# Patient Record
Sex: MALE | Race: WHITE | Employment: OTHER | URBAN - METROPOLITAN AREA
[De-identification: names, ages, dates, MRNs, and addresses within clinical notes are randomized per-mention and may not be internally consistent; named-entity substitution may affect disease eponyms.]

---

## 2019-01-23 ENCOUNTER — ANESTHESIA EVENT (OUTPATIENT)
Dept: OPERATING ROOM | Age: 54
End: 2019-01-23
Payer: COMMERCIAL

## 2019-01-29 RX ORDER — ALLOPURINOL 100 MG/1
200 TABLET ORAL DAILY
COMMUNITY

## 2019-01-29 RX ORDER — HYDROCODONE BITARTRATE AND ACETAMINOPHEN 7.5; 325 MG/1; MG/1
1 TABLET ORAL EVERY 8 HOURS PRN
COMMUNITY
End: 2019-03-22 | Stop reason: ALTCHOICE

## 2019-01-29 RX ORDER — LEVOTHYROXINE SODIUM 0.05 MG/1
50 TABLET ORAL DAILY
COMMUNITY

## 2019-02-04 ASSESSMENT — LIFESTYLE VARIABLES: SMOKING_STATUS: 1

## 2019-02-06 ENCOUNTER — HOSPITAL ENCOUNTER (OUTPATIENT)
Age: 54
Setting detail: OUTPATIENT SURGERY
Discharge: HOME | End: 2019-02-06
Attending: SURGERY | Admitting: SURGERY
Payer: COMMERCIAL

## 2019-02-06 ENCOUNTER — ANESTHESIA (OUTPATIENT)
Dept: OPERATING ROOM | Age: 54
End: 2019-02-06
Payer: COMMERCIAL

## 2019-02-06 VITALS
OXYGEN SATURATION: 95 % | HEART RATE: 93 BPM | SYSTOLIC BLOOD PRESSURE: 135 MMHG | BODY MASS INDEX: 38.22 KG/M2 | RESPIRATION RATE: 16 BRPM | WEIGHT: 267 LBS | HEIGHT: 70 IN | DIASTOLIC BLOOD PRESSURE: 91 MMHG

## 2019-02-06 VITALS
RESPIRATION RATE: 3 BRPM | SYSTOLIC BLOOD PRESSURE: 111 MMHG | DIASTOLIC BLOOD PRESSURE: 60 MMHG | OXYGEN SATURATION: 97 %

## 2019-02-06 DIAGNOSIS — S63.392A TRAUMATIC RUPTURE OF OTHER LIGAMENT OF LEFT WRIST, INITIAL ENCOUNTER: ICD-10-CM

## 2019-02-06 DIAGNOSIS — G56.22 LESION OF LEFT ULNAR NERVE: Primary | ICD-10-CM

## 2019-02-06 PROCEDURE — 6360000002 HC RX W HCPCS: Performed by: SURGERY

## 2019-02-06 PROCEDURE — 7100000010 HC PHASE II RECOVERY - FIRST 15 MIN: Performed by: SURGERY

## 2019-02-06 PROCEDURE — 3600000013 HC SURGERY LEVEL 3 ADDTL 15MIN: Performed by: SURGERY

## 2019-02-06 PROCEDURE — 2500000003 HC RX 250 WO HCPCS: Performed by: NURSE ANESTHETIST, CERTIFIED REGISTERED

## 2019-02-06 PROCEDURE — 2580000003 HC RX 258: Performed by: NURSE ANESTHETIST, CERTIFIED REGISTERED

## 2019-02-06 PROCEDURE — 2720000010 HC SURG SUPPLY STERILE: Performed by: SURGERY

## 2019-02-06 PROCEDURE — 7100000000 HC PACU RECOVERY - FIRST 15 MIN: Performed by: SURGERY

## 2019-02-06 PROCEDURE — 6360000002 HC RX W HCPCS: Performed by: ANESTHESIOLOGY

## 2019-02-06 PROCEDURE — 2580000003 HC RX 258: Performed by: SURGERY

## 2019-02-06 PROCEDURE — 7100000011 HC PHASE II RECOVERY - ADDTL 15 MIN: Performed by: SURGERY

## 2019-02-06 PROCEDURE — 6360000002 HC RX W HCPCS: Performed by: NURSE ANESTHETIST, CERTIFIED REGISTERED

## 2019-02-06 PROCEDURE — 2580000003 HC RX 258: Performed by: ANESTHESIOLOGY

## 2019-02-06 PROCEDURE — 6370000000 HC RX 637 (ALT 250 FOR IP): Performed by: ANESTHESIOLOGY

## 2019-02-06 PROCEDURE — 88311 DECALCIFY TISSUE: CPT

## 2019-02-06 PROCEDURE — 3600000003 HC SURGERY LEVEL 3 BASE: Performed by: SURGERY

## 2019-02-06 PROCEDURE — 88304 TISSUE EXAM BY PATHOLOGIST: CPT

## 2019-02-06 PROCEDURE — C1713 ANCHOR/SCREW BN/BN,TIS/BN: HCPCS | Performed by: SURGERY

## 2019-02-06 PROCEDURE — 3700000000 HC ANESTHESIA ATTENDED CARE: Performed by: SURGERY

## 2019-02-06 PROCEDURE — 2709999900 HC NON-CHARGEABLE SUPPLY: Performed by: SURGERY

## 2019-02-06 PROCEDURE — 3700000001 HC ADD 15 MINUTES (ANESTHESIA): Performed by: SURGERY

## 2019-02-06 PROCEDURE — 7100000001 HC PACU RECOVERY - ADDTL 15 MIN: Performed by: SURGERY

## 2019-02-06 PROCEDURE — 2500000003 HC RX 250 WO HCPCS: Performed by: SURGERY

## 2019-02-06 DEVICE — K WIRE FIX L6IN DIA1.6MM ST S STL 3 SIDE DBL TRCR BOTH END: Type: IMPLANTABLE DEVICE | Site: WRIST | Status: FUNCTIONAL

## 2019-02-06 DEVICE — K WIRE FIX L6IN DIA1.1MM ST S STL 3 SIDE DBL TRCR BOTH END: Type: IMPLANTABLE DEVICE | Site: WRIST | Status: FUNCTIONAL

## 2019-02-06 DEVICE — SCREW BNE L24MM DIA2.5MM MIC FT ANK TI SELF DRL ST CANN: Type: IMPLANTABLE DEVICE | Site: WRIST | Status: FUNCTIONAL

## 2019-02-06 RX ORDER — MORPHINE SULFATE 2 MG/ML
1 INJECTION, SOLUTION INTRAMUSCULAR; INTRAVENOUS EVERY 5 MIN PRN
Status: DISCONTINUED | OUTPATIENT
Start: 2019-02-06 | End: 2019-02-06 | Stop reason: HOSPADM

## 2019-02-06 RX ORDER — FENTANYL CITRATE 50 UG/ML
50 INJECTION, SOLUTION INTRAMUSCULAR; INTRAVENOUS EVERY 5 MIN PRN
Status: DISCONTINUED | OUTPATIENT
Start: 2019-02-06 | End: 2019-02-06 | Stop reason: HOSPADM

## 2019-02-06 RX ORDER — MEPERIDINE HYDROCHLORIDE 25 MG/ML
12.5 INJECTION INTRAMUSCULAR; INTRAVENOUS; SUBCUTANEOUS EVERY 5 MIN PRN
Status: DISCONTINUED | OUTPATIENT
Start: 2019-02-06 | End: 2019-02-06 | Stop reason: HOSPADM

## 2019-02-06 RX ORDER — PROMETHAZINE HYDROCHLORIDE 25 MG/ML
INJECTION, SOLUTION INTRAMUSCULAR; INTRAVENOUS PRN
Status: DISCONTINUED | OUTPATIENT
Start: 2019-02-06 | End: 2019-02-06 | Stop reason: SDUPTHER

## 2019-02-06 RX ORDER — MEPERIDINE HYDROCHLORIDE 50 MG/ML
INJECTION INTRAMUSCULAR; INTRAVENOUS; SUBCUTANEOUS PRN
Status: DISCONTINUED | OUTPATIENT
Start: 2019-02-06 | End: 2019-02-06 | Stop reason: SDUPTHER

## 2019-02-06 RX ORDER — HYDRALAZINE HYDROCHLORIDE 20 MG/ML
5 INJECTION INTRAMUSCULAR; INTRAVENOUS EVERY 10 MIN PRN
Status: DISCONTINUED | OUTPATIENT
Start: 2019-02-06 | End: 2019-02-06 | Stop reason: HOSPADM

## 2019-02-06 RX ORDER — MIDAZOLAM HYDROCHLORIDE 1 MG/ML
INJECTION INTRAMUSCULAR; INTRAVENOUS PRN
Status: DISCONTINUED | OUTPATIENT
Start: 2019-02-06 | End: 2019-02-06 | Stop reason: SDUPTHER

## 2019-02-06 RX ORDER — LIDOCAINE HYDROCHLORIDE 20 MG/ML
INJECTION, SOLUTION INFILTRATION; PERINEURAL PRN
Status: DISCONTINUED | OUTPATIENT
Start: 2019-02-06 | End: 2019-02-06 | Stop reason: SDUPTHER

## 2019-02-06 RX ORDER — PROMETHAZINE HYDROCHLORIDE 25 MG/ML
25 INJECTION, SOLUTION INTRAMUSCULAR; INTRAVENOUS
Status: DISCONTINUED | OUTPATIENT
Start: 2019-02-06 | End: 2019-02-06 | Stop reason: HOSPADM

## 2019-02-06 RX ORDER — CEPHALEXIN 500 MG/1
500 CAPSULE ORAL 3 TIMES DAILY
Qty: 31 CAPSULE | Refills: 0 | Status: SHIPPED | OUTPATIENT
Start: 2019-02-06 | End: 2019-03-22 | Stop reason: ALTCHOICE

## 2019-02-06 RX ORDER — ONDANSETRON 2 MG/ML
INJECTION INTRAMUSCULAR; INTRAVENOUS PRN
Status: DISCONTINUED | OUTPATIENT
Start: 2019-02-06 | End: 2019-02-06 | Stop reason: SDUPTHER

## 2019-02-06 RX ORDER — FENTANYL CITRATE 50 UG/ML
INJECTION, SOLUTION INTRAMUSCULAR; INTRAVENOUS PRN
Status: DISCONTINUED | OUTPATIENT
Start: 2019-02-06 | End: 2019-02-06 | Stop reason: SDUPTHER

## 2019-02-06 RX ORDER — ROCURONIUM BROMIDE 10 MG/ML
INJECTION, SOLUTION INTRAVENOUS PRN
Status: DISCONTINUED | OUTPATIENT
Start: 2019-02-06 | End: 2019-02-06 | Stop reason: SDUPTHER

## 2019-02-06 RX ORDER — DEXAMETHASONE SODIUM PHOSPHATE 10 MG/ML
INJECTION, SOLUTION INTRAMUSCULAR; INTRAVENOUS PRN
Status: DISCONTINUED | OUTPATIENT
Start: 2019-02-06 | End: 2019-02-06 | Stop reason: SDUPTHER

## 2019-02-06 RX ORDER — OXYCODONE HYDROCHLORIDE AND ACETAMINOPHEN 5; 325 MG/1; MG/1
1 TABLET ORAL EVERY 4 HOURS PRN
Qty: 42 TABLET | Refills: 0 | Status: SHIPPED | OUTPATIENT
Start: 2019-02-06 | End: 2019-02-13

## 2019-02-06 RX ORDER — SODIUM CHLORIDE 9 MG/ML
INJECTION, SOLUTION INTRAVENOUS CONTINUOUS PRN
Status: DISCONTINUED | OUTPATIENT
Start: 2019-02-06 | End: 2019-02-06 | Stop reason: SDUPTHER

## 2019-02-06 RX ORDER — METOCLOPRAMIDE 10 MG/1
10 TABLET ORAL ONCE
Status: COMPLETED | OUTPATIENT
Start: 2019-02-06 | End: 2019-02-06

## 2019-02-06 RX ORDER — LABETALOL HYDROCHLORIDE 5 MG/ML
5 INJECTION, SOLUTION INTRAVENOUS EVERY 10 MIN PRN
Status: DISCONTINUED | OUTPATIENT
Start: 2019-02-06 | End: 2019-02-06 | Stop reason: HOSPADM

## 2019-02-06 RX ORDER — EPHEDRINE SULFATE/0.9% NACL/PF 50 MG/5 ML
SYRINGE (ML) INTRAVENOUS PRN
Status: DISCONTINUED | OUTPATIENT
Start: 2019-02-06 | End: 2019-02-06 | Stop reason: SDUPTHER

## 2019-02-06 RX ORDER — OXYCODONE HYDROCHLORIDE AND ACETAMINOPHEN 5; 325 MG/1; MG/1
2 TABLET ORAL EVERY 4 HOURS PRN
Status: DISCONTINUED | OUTPATIENT
Start: 2019-02-06 | End: 2019-02-06 | Stop reason: HOSPADM

## 2019-02-06 RX ORDER — DIPHENHYDRAMINE HYDROCHLORIDE 50 MG/ML
12.5 INJECTION INTRAMUSCULAR; INTRAVENOUS
Status: DISCONTINUED | OUTPATIENT
Start: 2019-02-06 | End: 2019-02-06 | Stop reason: HOSPADM

## 2019-02-06 RX ORDER — SUCCINYLCHOLINE CHLORIDE 20 MG/ML
INJECTION INTRAMUSCULAR; INTRAVENOUS PRN
Status: DISCONTINUED | OUTPATIENT
Start: 2019-02-06 | End: 2019-02-06 | Stop reason: SDUPTHER

## 2019-02-06 RX ORDER — PROPOFOL 10 MG/ML
INJECTION, EMULSION INTRAVENOUS PRN
Status: DISCONTINUED | OUTPATIENT
Start: 2019-02-06 | End: 2019-02-06 | Stop reason: SDUPTHER

## 2019-02-06 RX ORDER — GLYCOPYRROLATE 1 MG/5 ML
SYRINGE (ML) INTRAVENOUS PRN
Status: DISCONTINUED | OUTPATIENT
Start: 2019-02-06 | End: 2019-02-06 | Stop reason: SDUPTHER

## 2019-02-06 RX ORDER — FAMOTIDINE 20 MG/1
20 TABLET, FILM COATED ORAL ONCE
Status: COMPLETED | OUTPATIENT
Start: 2019-02-06 | End: 2019-02-06

## 2019-02-06 RX ORDER — NEOSTIGMINE METHYLSULFATE 1 MG/ML
INJECTION, SOLUTION INTRAVENOUS PRN
Status: DISCONTINUED | OUTPATIENT
Start: 2019-02-06 | End: 2019-02-06 | Stop reason: SDUPTHER

## 2019-02-06 RX ORDER — SODIUM CHLORIDE, SODIUM LACTATE, POTASSIUM CHLORIDE, CALCIUM CHLORIDE 600; 310; 30; 20 MG/100ML; MG/100ML; MG/100ML; MG/100ML
INJECTION, SOLUTION INTRAVENOUS CONTINUOUS
Status: DISCONTINUED | OUTPATIENT
Start: 2019-02-06 | End: 2019-02-06 | Stop reason: HOSPADM

## 2019-02-06 RX ORDER — HYDROMORPHONE HYDROCHLORIDE 1 MG/ML
0.25 INJECTION, SOLUTION INTRAMUSCULAR; INTRAVENOUS; SUBCUTANEOUS EVERY 5 MIN PRN
Status: DISCONTINUED | OUTPATIENT
Start: 2019-02-06 | End: 2019-02-06 | Stop reason: HOSPADM

## 2019-02-06 RX ADMIN — FENTANYL CITRATE 50 MCG: 50 INJECTION, SOLUTION INTRAMUSCULAR; INTRAVENOUS at 09:17

## 2019-02-06 RX ADMIN — FENTANYL CITRATE 50 MCG: 50 INJECTION, SOLUTION INTRAMUSCULAR; INTRAVENOUS at 09:30

## 2019-02-06 RX ADMIN — FAMOTIDINE 20 MG: 20 TABLET ORAL at 07:50

## 2019-02-06 RX ADMIN — SUCCINYLCHOLINE CHLORIDE 120 MG: 20 INJECTION, SOLUTION INTRAMUSCULAR; INTRAVENOUS at 09:33

## 2019-02-06 RX ADMIN — Medication 15 MG: at 09:53

## 2019-02-06 RX ADMIN — FENTANYL CITRATE 100 MCG: 50 INJECTION, SOLUTION INTRAMUSCULAR; INTRAVENOUS at 14:50

## 2019-02-06 RX ADMIN — FENTANYL CITRATE 50 MCG: 50 INJECTION, SOLUTION INTRAMUSCULAR; INTRAVENOUS at 09:27

## 2019-02-06 RX ADMIN — FENTANYL CITRATE 50 MCG: 50 INJECTION, SOLUTION INTRAMUSCULAR; INTRAVENOUS at 15:07

## 2019-02-06 RX ADMIN — ROCURONIUM BROMIDE 20 MG: 10 INJECTION, SOLUTION INTRAVENOUS at 14:59

## 2019-02-06 RX ADMIN — FENTANYL CITRATE 50 MCG: 50 INJECTION, SOLUTION INTRAMUSCULAR; INTRAVENOUS at 12:14

## 2019-02-06 RX ADMIN — FENTANYL CITRATE 50 MCG: 50 INJECTION, SOLUTION INTRAMUSCULAR; INTRAVENOUS at 09:22

## 2019-02-06 RX ADMIN — FENTANYL CITRATE 50 MCG: 50 INJECTION, SOLUTION INTRAMUSCULAR; INTRAVENOUS at 12:39

## 2019-02-06 RX ADMIN — ROCURONIUM BROMIDE 10 MG: 10 INJECTION, SOLUTION INTRAVENOUS at 12:15

## 2019-02-06 RX ADMIN — PROMETHAZINE HYDROCHLORIDE 12.5 MG: 25 INJECTION INTRAMUSCULAR; INTRAVENOUS at 15:56

## 2019-02-06 RX ADMIN — PHENYLEPHRINE HYDROCHLORIDE 100 MCG: 10 INJECTION INTRAVENOUS at 10:44

## 2019-02-06 RX ADMIN — METOCLOPRAMIDE 10 MG: 10 TABLET ORAL at 07:50

## 2019-02-06 RX ADMIN — FENTANYL CITRATE 100 MCG: 50 INJECTION, SOLUTION INTRAMUSCULAR; INTRAVENOUS at 12:46

## 2019-02-06 RX ADMIN — ONDANSETRON HYDROCHLORIDE 4 MG: 2 INJECTION, SOLUTION INTRAMUSCULAR; INTRAVENOUS at 15:27

## 2019-02-06 RX ADMIN — FENTANYL CITRATE 50 MCG: 50 INJECTION, SOLUTION INTRAMUSCULAR; INTRAVENOUS at 11:15

## 2019-02-06 RX ADMIN — Medication 25 MG: at 10:01

## 2019-02-06 RX ADMIN — OXYCODONE HYDROCHLORIDE AND ACETAMINOPHEN 2 TABLET: 5; 325 TABLET ORAL at 17:26

## 2019-02-06 RX ADMIN — ROCURONIUM BROMIDE 10 MG: 10 INJECTION, SOLUTION INTRAVENOUS at 11:55

## 2019-02-06 RX ADMIN — CEFAZOLIN 3 G: 1 INJECTION, POWDER, FOR SOLUTION INTRAMUSCULAR; INTRAVENOUS at 09:15

## 2019-02-06 RX ADMIN — PROPOFOL 180 MG: 10 INJECTION, EMULSION INTRAVENOUS at 09:33

## 2019-02-06 RX ADMIN — SODIUM CHLORIDE: 9 INJECTION, SOLUTION INTRAVENOUS at 13:22

## 2019-02-06 RX ADMIN — Medication 10 MG: at 09:50

## 2019-02-06 RX ADMIN — Medication 0.6 MG: at 15:42

## 2019-02-06 RX ADMIN — PHENYLEPHRINE HYDROCHLORIDE 200 MCG: 10 INJECTION INTRAVENOUS at 11:00

## 2019-02-06 RX ADMIN — ROCURONIUM BROMIDE 10 MG: 10 INJECTION, SOLUTION INTRAVENOUS at 11:29

## 2019-02-06 RX ADMIN — ROCURONIUM BROMIDE 5 MG: 10 INJECTION, SOLUTION INTRAVENOUS at 09:33

## 2019-02-06 RX ADMIN — SODIUM CHLORIDE, POTASSIUM CHLORIDE, SODIUM LACTATE AND CALCIUM CHLORIDE: 600; 310; 30; 20 INJECTION, SOLUTION INTRAVENOUS at 10:15

## 2019-02-06 RX ADMIN — ROCURONIUM BROMIDE 10 MG: 10 INJECTION, SOLUTION INTRAVENOUS at 10:45

## 2019-02-06 RX ADMIN — MEPERIDINE HYDROCHLORIDE 25 MG: 50 INJECTION, SOLUTION INTRAMUSCULAR; INTRAVENOUS; SUBCUTANEOUS at 15:54

## 2019-02-06 RX ADMIN — ROCURONIUM BROMIDE 10 MG: 10 INJECTION, SOLUTION INTRAVENOUS at 10:12

## 2019-02-06 RX ADMIN — FENTANYL CITRATE 50 MCG: 50 INJECTION, SOLUTION INTRAMUSCULAR; INTRAVENOUS at 09:25

## 2019-02-06 RX ADMIN — ROCURONIUM BROMIDE 10 MG: 10 INJECTION, SOLUTION INTRAVENOUS at 13:03

## 2019-02-06 RX ADMIN — LIDOCAINE HYDROCHLORIDE 50 MG: 20 INJECTION, SOLUTION INFILTRATION; PERINEURAL at 09:33

## 2019-02-06 RX ADMIN — ROCURONIUM BROMIDE 10 MG: 10 INJECTION, SOLUTION INTRAVENOUS at 12:38

## 2019-02-06 RX ADMIN — PHENYLEPHRINE HYDROCHLORIDE 100 MCG: 10 INJECTION INTRAVENOUS at 10:12

## 2019-02-06 RX ADMIN — MORPHINE SULFATE 1 MG: 2 INJECTION, SOLUTION INTRAMUSCULAR; INTRAVENOUS at 16:54

## 2019-02-06 RX ADMIN — MIDAZOLAM 2 MG: 1 INJECTION INTRAMUSCULAR; INTRAVENOUS at 09:16

## 2019-02-06 RX ADMIN — PROPOFOL 20 MG: 10 INJECTION, EMULSION INTRAVENOUS at 09:25

## 2019-02-06 RX ADMIN — DEXAMETHASONE SODIUM PHOSPHATE 10 MG: 10 INJECTION, SOLUTION INTRAMUSCULAR; INTRAVENOUS at 09:57

## 2019-02-06 RX ADMIN — MEPERIDINE HYDROCHLORIDE 25 MG: 50 INJECTION, SOLUTION INTRAMUSCULAR; INTRAVENOUS; SUBCUTANEOUS at 15:56

## 2019-02-06 RX ADMIN — ROCURONIUM BROMIDE 35 MG: 10 INJECTION, SOLUTION INTRAVENOUS at 09:57

## 2019-02-06 RX ADMIN — Medication 3 MG: at 15:42

## 2019-02-06 RX ADMIN — ROCURONIUM BROMIDE 10 MG: 10 INJECTION, SOLUTION INTRAVENOUS at 14:30

## 2019-02-06 RX ADMIN — MORPHINE SULFATE 1 MG: 2 INJECTION, SOLUTION INTRAMUSCULAR; INTRAVENOUS at 17:00

## 2019-02-06 RX ADMIN — ROCURONIUM BROMIDE 10 MG: 10 INJECTION, SOLUTION INTRAVENOUS at 13:54

## 2019-02-06 RX ADMIN — PHENYLEPHRINE HYDROCHLORIDE 100 MCG/MIN: 10 INJECTION, SOLUTION INTRAMUSCULAR; INTRAVENOUS; SUBCUTANEOUS at 11:05

## 2019-02-06 RX ADMIN — ROCURONIUM BROMIDE 10 MG: 10 INJECTION, SOLUTION INTRAVENOUS at 11:15

## 2019-02-06 RX ADMIN — SODIUM CHLORIDE, POTASSIUM CHLORIDE, SODIUM LACTATE AND CALCIUM CHLORIDE: 600; 310; 30; 20 INJECTION, SOLUTION INTRAVENOUS at 08:11

## 2019-02-06 RX ADMIN — PHENYLEPHRINE HYDROCHLORIDE 100 MCG: 10 INJECTION INTRAVENOUS at 10:39

## 2019-02-06 ASSESSMENT — PULMONARY FUNCTION TESTS
PIF_VALUE: 26
PIF_VALUE: 22
PIF_VALUE: 23
PIF_VALUE: 33
PIF_VALUE: 28
PIF_VALUE: 22
PIF_VALUE: 15
PIF_VALUE: 23
PIF_VALUE: 21
PIF_VALUE: 23
PIF_VALUE: 26
PIF_VALUE: 25
PIF_VALUE: 23
PIF_VALUE: 26
PIF_VALUE: 20
PIF_VALUE: 23
PIF_VALUE: 26
PIF_VALUE: 21
PIF_VALUE: 22
PIF_VALUE: 22
PIF_VALUE: 26
PIF_VALUE: 0
PIF_VALUE: 26
PIF_VALUE: 28
PIF_VALUE: 25
PIF_VALUE: 33
PIF_VALUE: 23
PIF_VALUE: 0
PIF_VALUE: 26
PIF_VALUE: 23
PIF_VALUE: 23
PIF_VALUE: 28
PIF_VALUE: 34
PIF_VALUE: 28
PIF_VALUE: 22
PIF_VALUE: 23
PIF_VALUE: 33
PIF_VALUE: 26
PIF_VALUE: 25
PIF_VALUE: 25
PIF_VALUE: 21
PIF_VALUE: 0
PIF_VALUE: 28
PIF_VALUE: 27
PIF_VALUE: 26
PIF_VALUE: 22
PIF_VALUE: 33
PIF_VALUE: 21
PIF_VALUE: 33
PIF_VALUE: 23
PIF_VALUE: 3
PIF_VALUE: 20
PIF_VALUE: 26
PIF_VALUE: 25
PIF_VALUE: 26
PIF_VALUE: 26
PIF_VALUE: 22
PIF_VALUE: 33
PIF_VALUE: 22
PIF_VALUE: 24
PIF_VALUE: 21
PIF_VALUE: 28
PIF_VALUE: 26
PIF_VALUE: 26
PIF_VALUE: 15
PIF_VALUE: 24
PIF_VALUE: 23
PIF_VALUE: 23
PIF_VALUE: 25
PIF_VALUE: 23
PIF_VALUE: 25
PIF_VALUE: 26
PIF_VALUE: 2
PIF_VALUE: 26
PIF_VALUE: 23
PIF_VALUE: 2
PIF_VALUE: 25
PIF_VALUE: 26
PIF_VALUE: 26
PIF_VALUE: 0
PIF_VALUE: 25
PIF_VALUE: 28
PIF_VALUE: 27
PIF_VALUE: 22
PIF_VALUE: 26
PIF_VALUE: 26
PIF_VALUE: 0
PIF_VALUE: 25
PIF_VALUE: 20
PIF_VALUE: 27
PIF_VALUE: 26
PIF_VALUE: 32
PIF_VALUE: 25
PIF_VALUE: 22
PIF_VALUE: 34
PIF_VALUE: 21
PIF_VALUE: 22
PIF_VALUE: 2
PIF_VALUE: 25
PIF_VALUE: 26
PIF_VALUE: 23
PIF_VALUE: 22
PIF_VALUE: 29
PIF_VALUE: 27
PIF_VALUE: 25
PIF_VALUE: 22
PIF_VALUE: 19
PIF_VALUE: 29
PIF_VALUE: 26
PIF_VALUE: 25
PIF_VALUE: 26
PIF_VALUE: 22
PIF_VALUE: 22
PIF_VALUE: 23
PIF_VALUE: 23
PIF_VALUE: 22
PIF_VALUE: 20
PIF_VALUE: 22
PIF_VALUE: 28
PIF_VALUE: 26
PIF_VALUE: 27
PIF_VALUE: 22
PIF_VALUE: 27
PIF_VALUE: 27
PIF_VALUE: 23
PIF_VALUE: 23
PIF_VALUE: 22
PIF_VALUE: 26
PIF_VALUE: 22
PIF_VALUE: 23
PIF_VALUE: 27
PIF_VALUE: 25
PIF_VALUE: 28
PIF_VALUE: 23
PIF_VALUE: 27
PIF_VALUE: 22
PIF_VALUE: 28
PIF_VALUE: 26
PIF_VALUE: 25
PIF_VALUE: 0
PIF_VALUE: 25
PIF_VALUE: 22
PIF_VALUE: 21
PIF_VALUE: 0
PIF_VALUE: 28
PIF_VALUE: 33
PIF_VALUE: 23
PIF_VALUE: 25
PIF_VALUE: 27
PIF_VALUE: 0
PIF_VALUE: 19
PIF_VALUE: 27
PIF_VALUE: 26
PIF_VALUE: 20
PIF_VALUE: 0
PIF_VALUE: 22
PIF_VALUE: 27
PIF_VALUE: 33
PIF_VALUE: 20
PIF_VALUE: 22
PIF_VALUE: 23
PIF_VALUE: 25
PIF_VALUE: 33
PIF_VALUE: 33
PIF_VALUE: 20
PIF_VALUE: 23
PIF_VALUE: 26
PIF_VALUE: 3
PIF_VALUE: 22
PIF_VALUE: 22
PIF_VALUE: 26
PIF_VALUE: 27
PIF_VALUE: 26
PIF_VALUE: 28
PIF_VALUE: 20
PIF_VALUE: 26
PIF_VALUE: 3
PIF_VALUE: 22
PIF_VALUE: 23
PIF_VALUE: 3
PIF_VALUE: 20
PIF_VALUE: 26
PIF_VALUE: 33
PIF_VALUE: 27
PIF_VALUE: 20
PIF_VALUE: 26
PIF_VALUE: 2
PIF_VALUE: 25
PIF_VALUE: 22
PIF_VALUE: 27
PIF_VALUE: 26
PIF_VALUE: 3
PIF_VALUE: 22
PIF_VALUE: 18
PIF_VALUE: 24
PIF_VALUE: 25
PIF_VALUE: 28
PIF_VALUE: 27
PIF_VALUE: 20
PIF_VALUE: 25
PIF_VALUE: 20
PIF_VALUE: 26
PIF_VALUE: 25
PIF_VALUE: 22
PIF_VALUE: 26
PIF_VALUE: 26
PIF_VALUE: 25
PIF_VALUE: 20
PIF_VALUE: 25
PIF_VALUE: 1
PIF_VALUE: 22
PIF_VALUE: 21
PIF_VALUE: 26
PIF_VALUE: 22
PIF_VALUE: 1
PIF_VALUE: 22
PIF_VALUE: 26
PIF_VALUE: 25
PIF_VALUE: 23
PIF_VALUE: 2
PIF_VALUE: 0
PIF_VALUE: 20
PIF_VALUE: 26
PIF_VALUE: 0
PIF_VALUE: 26
PIF_VALUE: 34
PIF_VALUE: 18
PIF_VALUE: 27
PIF_VALUE: 26
PIF_VALUE: 25
PIF_VALUE: 19
PIF_VALUE: 27
PIF_VALUE: 28
PIF_VALUE: 25
PIF_VALUE: 21
PIF_VALUE: 22
PIF_VALUE: 28
PIF_VALUE: 26
PIF_VALUE: 23
PIF_VALUE: 22
PIF_VALUE: 34
PIF_VALUE: 22
PIF_VALUE: 20
PIF_VALUE: 28
PIF_VALUE: 23
PIF_VALUE: 23
PIF_VALUE: 28
PIF_VALUE: 2
PIF_VALUE: 25
PIF_VALUE: 0
PIF_VALUE: 25
PIF_VALUE: 22
PIF_VALUE: 20
PIF_VALUE: 26
PIF_VALUE: 27
PIF_VALUE: 26
PIF_VALUE: 26
PIF_VALUE: 23
PIF_VALUE: 33
PIF_VALUE: 24
PIF_VALUE: 23
PIF_VALUE: 26
PIF_VALUE: 26
PIF_VALUE: 25
PIF_VALUE: 0
PIF_VALUE: 1
PIF_VALUE: 28
PIF_VALUE: 23
PIF_VALUE: 22
PIF_VALUE: 13
PIF_VALUE: 22
PIF_VALUE: 4
PIF_VALUE: 27
PIF_VALUE: 20
PIF_VALUE: 27
PIF_VALUE: 20
PIF_VALUE: 26
PIF_VALUE: 26
PIF_VALUE: 23
PIF_VALUE: 29
PIF_VALUE: 25
PIF_VALUE: 20
PIF_VALUE: 26
PIF_VALUE: 25
PIF_VALUE: 20
PIF_VALUE: 23
PIF_VALUE: 25
PIF_VALUE: 26
PIF_VALUE: 23
PIF_VALUE: 0
PIF_VALUE: 26
PIF_VALUE: 23
PIF_VALUE: 25
PIF_VALUE: 27
PIF_VALUE: 25
PIF_VALUE: 23
PIF_VALUE: 3
PIF_VALUE: 26
PIF_VALUE: 26
PIF_VALUE: 25
PIF_VALUE: 22
PIF_VALUE: 23
PIF_VALUE: 22
PIF_VALUE: 24
PIF_VALUE: 22
PIF_VALUE: 22
PIF_VALUE: 26
PIF_VALUE: 0
PIF_VALUE: 26
PIF_VALUE: 28
PIF_VALUE: 26
PIF_VALUE: 29
PIF_VALUE: 25
PIF_VALUE: 26
PIF_VALUE: 22
PIF_VALUE: 25
PIF_VALUE: 28
PIF_VALUE: 26
PIF_VALUE: 20
PIF_VALUE: 27
PIF_VALUE: 20
PIF_VALUE: 28
PIF_VALUE: 22
PIF_VALUE: 21
PIF_VALUE: 23
PIF_VALUE: 20
PIF_VALUE: 28
PIF_VALUE: 27
PIF_VALUE: 26
PIF_VALUE: 23
PIF_VALUE: 27
PIF_VALUE: 26
PIF_VALUE: 28
PIF_VALUE: 20
PIF_VALUE: 34
PIF_VALUE: 22
PIF_VALUE: 25
PIF_VALUE: 25
PIF_VALUE: 23
PIF_VALUE: 23
PIF_VALUE: 27
PIF_VALUE: 25
PIF_VALUE: 26
PIF_VALUE: 23
PIF_VALUE: 23
PIF_VALUE: 22
PIF_VALUE: 26
PIF_VALUE: 22
PIF_VALUE: 27
PIF_VALUE: 33
PIF_VALUE: 33
PIF_VALUE: 27
PIF_VALUE: 2
PIF_VALUE: 29
PIF_VALUE: 26
PIF_VALUE: 33
PIF_VALUE: 28
PIF_VALUE: 27
PIF_VALUE: 23
PIF_VALUE: 26
PIF_VALUE: 27
PIF_VALUE: 21
PIF_VALUE: 28
PIF_VALUE: 20
PIF_VALUE: 25
PIF_VALUE: 28
PIF_VALUE: 22
PIF_VALUE: 27
PIF_VALUE: 23
PIF_VALUE: 23
PIF_VALUE: 27
PIF_VALUE: 27
PIF_VALUE: 1
PIF_VALUE: 26
PIF_VALUE: 21
PIF_VALUE: 26
PIF_VALUE: 20
PIF_VALUE: 28
PIF_VALUE: 26
PIF_VALUE: 20
PIF_VALUE: 24
PIF_VALUE: 28
PIF_VALUE: 20
PIF_VALUE: 23
PIF_VALUE: 23
PIF_VALUE: 6
PIF_VALUE: 20
PIF_VALUE: 26
PIF_VALUE: 20
PIF_VALUE: 26
PIF_VALUE: 23
PIF_VALUE: 23
PIF_VALUE: 33
PIF_VALUE: 23
PIF_VALUE: 28
PIF_VALUE: 26
PIF_VALUE: 22
PIF_VALUE: 25
PIF_VALUE: 1
PIF_VALUE: 23
PIF_VALUE: 28
PIF_VALUE: 22
PIF_VALUE: 20

## 2019-02-06 ASSESSMENT — PAIN SCALES - GENERAL
PAINLEVEL_OUTOF10: 0
PAINLEVEL_OUTOF10: 8
PAINLEVEL_OUTOF10: 10
PAINLEVEL_OUTOF10: 9
PAINLEVEL_OUTOF10: 8
PAINLEVEL_OUTOF10: 10
PAINLEVEL_OUTOF10: 0
PAINLEVEL_OUTOF10: 8
PAINLEVEL_OUTOF10: 8
PAINLEVEL_OUTOF10: 10
PAINLEVEL_OUTOF10: 8

## 2019-02-06 ASSESSMENT — PAIN DESCRIPTION - DESCRIPTORS
DESCRIPTORS: ACHING;BURNING;DISCOMFORT;THROBBING
DESCRIPTORS: ACHING;BURNING;DISCOMFORT;THROBBING
DESCRIPTORS: ACHING;DISCOMFORT;BURNING
DESCRIPTORS: ACHING;BURNING;DISCOMFORT;THROBBING
DESCRIPTORS: ACHING;BURNING;DISCOMFORT;THROBBING
DESCRIPTORS: ACHING
DESCRIPTORS: ACHING;BURNING;DISCOMFORT;THROBBING

## 2019-02-06 ASSESSMENT — PAIN DESCRIPTION - PAIN TYPE
TYPE: SURGICAL PAIN
TYPE: CHRONIC PAIN
TYPE: SURGICAL PAIN
TYPE: SURGICAL PAIN

## 2019-02-06 ASSESSMENT — PAIN DESCRIPTION - LOCATION
LOCATION: HAND;WRIST
LOCATION: HAND
LOCATION: HAND;WRIST
LOCATION: HAND

## 2019-02-06 ASSESSMENT — PAIN DESCRIPTION - FREQUENCY
FREQUENCY: CONTINUOUS

## 2019-02-06 ASSESSMENT — PAIN DESCRIPTION - ORIENTATION
ORIENTATION: LEFT

## 2019-02-06 ASSESSMENT — PAIN - FUNCTIONAL ASSESSMENT: PAIN_FUNCTIONAL_ASSESSMENT: 0-10

## 2019-03-22 RX ORDER — OXYCODONE HYDROCHLORIDE AND ACETAMINOPHEN 5; 325 MG/1; MG/1
1 TABLET ORAL EVERY 4 HOURS PRN
COMMUNITY
End: 2020-04-01 | Stop reason: ALTCHOICE

## 2019-03-26 ENCOUNTER — ANESTHESIA EVENT (OUTPATIENT)
Dept: OPERATING ROOM | Age: 54
End: 2019-03-26
Payer: COMMERCIAL

## 2019-03-26 ASSESSMENT — LIFESTYLE VARIABLES: SMOKING_STATUS: 1

## 2019-03-27 ENCOUNTER — HOSPITAL ENCOUNTER (OUTPATIENT)
Age: 54
Setting detail: OUTPATIENT SURGERY
Discharge: HOME | End: 2019-03-27
Attending: SURGERY | Admitting: SURGERY
Payer: COMMERCIAL

## 2019-03-27 ENCOUNTER — ANESTHESIA (OUTPATIENT)
Dept: OPERATING ROOM | Age: 54
End: 2019-03-27
Payer: COMMERCIAL

## 2019-03-27 VITALS
HEIGHT: 70 IN | RESPIRATION RATE: 18 BRPM | HEART RATE: 63 BPM | BODY MASS INDEX: 36.51 KG/M2 | WEIGHT: 255 LBS | TEMPERATURE: 98 F | OXYGEN SATURATION: 96 % | SYSTOLIC BLOOD PRESSURE: 109 MMHG | DIASTOLIC BLOOD PRESSURE: 72 MMHG

## 2019-03-27 VITALS
RESPIRATION RATE: 17 BRPM | OXYGEN SATURATION: 96 % | DIASTOLIC BLOOD PRESSURE: 68 MMHG | SYSTOLIC BLOOD PRESSURE: 122 MMHG

## 2019-03-27 DIAGNOSIS — R07.89 CHEST WALL PAIN FOLLOWING SURGERY: Primary | ICD-10-CM

## 2019-03-27 DIAGNOSIS — G89.18 CHEST WALL PAIN FOLLOWING SURGERY: Primary | ICD-10-CM

## 2019-03-27 DIAGNOSIS — G89.18 PAIN FOLLOWING SURGERY OR PROCEDURE: ICD-10-CM

## 2019-03-27 PROCEDURE — 2580000003 HC RX 258: Performed by: ANESTHESIOLOGY

## 2019-03-27 PROCEDURE — 3700000001 HC ADD 15 MINUTES (ANESTHESIA): Performed by: SURGERY

## 2019-03-27 PROCEDURE — 2500000003 HC RX 250 WO HCPCS: Performed by: SURGERY

## 2019-03-27 PROCEDURE — 3600000002 HC SURGERY LEVEL 2 BASE: Performed by: SURGERY

## 2019-03-27 PROCEDURE — 6360000002 HC RX W HCPCS: Performed by: NURSE ANESTHETIST, CERTIFIED REGISTERED

## 2019-03-27 PROCEDURE — 3600000012 HC SURGERY LEVEL 2 ADDTL 15MIN: Performed by: SURGERY

## 2019-03-27 PROCEDURE — 6360000002 HC RX W HCPCS: Performed by: SURGERY

## 2019-03-27 PROCEDURE — 7100000010 HC PHASE II RECOVERY - FIRST 15 MIN: Performed by: SURGERY

## 2019-03-27 PROCEDURE — 3700000000 HC ANESTHESIA ATTENDED CARE: Performed by: SURGERY

## 2019-03-27 PROCEDURE — 7100000011 HC PHASE II RECOVERY - ADDTL 15 MIN: Performed by: SURGERY

## 2019-03-27 PROCEDURE — 2580000003 HC RX 258: Performed by: SURGERY

## 2019-03-27 PROCEDURE — 2709999900 HC NON-CHARGEABLE SUPPLY: Performed by: SURGERY

## 2019-03-27 RX ORDER — MEPERIDINE HYDROCHLORIDE 50 MG/ML
12.5 INJECTION INTRAMUSCULAR; INTRAVENOUS; SUBCUTANEOUS EVERY 5 MIN PRN
Status: DISCONTINUED | OUTPATIENT
Start: 2019-03-27 | End: 2019-03-27 | Stop reason: HOSPADM

## 2019-03-27 RX ORDER — MIDAZOLAM HYDROCHLORIDE 1 MG/ML
INJECTION INTRAMUSCULAR; INTRAVENOUS PRN
Status: DISCONTINUED | OUTPATIENT
Start: 2019-03-27 | End: 2019-03-27 | Stop reason: SDUPTHER

## 2019-03-27 RX ORDER — DIPHENHYDRAMINE HYDROCHLORIDE 50 MG/ML
12.5 INJECTION INTRAMUSCULAR; INTRAVENOUS
Status: DISCONTINUED | OUTPATIENT
Start: 2019-03-27 | End: 2019-03-27 | Stop reason: HOSPADM

## 2019-03-27 RX ORDER — OXYCODONE HYDROCHLORIDE AND ACETAMINOPHEN 5; 325 MG/1; MG/1
1 TABLET ORAL EVERY 4 HOURS PRN
Status: DISCONTINUED | OUTPATIENT
Start: 2019-03-27 | End: 2019-03-27 | Stop reason: HOSPADM

## 2019-03-27 RX ORDER — PROMETHAZINE HYDROCHLORIDE 25 MG/ML
25 INJECTION, SOLUTION INTRAMUSCULAR; INTRAVENOUS
Status: DISCONTINUED | OUTPATIENT
Start: 2019-03-27 | End: 2019-03-27 | Stop reason: HOSPADM

## 2019-03-27 RX ORDER — FENTANYL CITRATE 50 UG/ML
50 INJECTION, SOLUTION INTRAMUSCULAR; INTRAVENOUS EVERY 5 MIN PRN
Status: DISCONTINUED | OUTPATIENT
Start: 2019-03-27 | End: 2019-03-27 | Stop reason: HOSPADM

## 2019-03-27 RX ORDER — HYDROMORPHONE HYDROCHLORIDE 1 MG/ML
0.25 INJECTION, SOLUTION INTRAMUSCULAR; INTRAVENOUS; SUBCUTANEOUS EVERY 5 MIN PRN
Status: DISCONTINUED | OUTPATIENT
Start: 2019-03-27 | End: 2019-03-27 | Stop reason: HOSPADM

## 2019-03-27 RX ORDER — PROPOFOL 10 MG/ML
INJECTION, EMULSION INTRAVENOUS PRN
Status: DISCONTINUED | OUTPATIENT
Start: 2019-03-27 | End: 2019-03-27 | Stop reason: SDUPTHER

## 2019-03-27 RX ORDER — PROPOFOL 10 MG/ML
INJECTION, EMULSION INTRAVENOUS CONTINUOUS PRN
Status: DISCONTINUED | OUTPATIENT
Start: 2019-03-27 | End: 2019-03-27 | Stop reason: SDUPTHER

## 2019-03-27 RX ORDER — MORPHINE SULFATE 2 MG/ML
1 INJECTION, SOLUTION INTRAMUSCULAR; INTRAVENOUS EVERY 5 MIN PRN
Status: DISCONTINUED | OUTPATIENT
Start: 2019-03-27 | End: 2019-03-27 | Stop reason: HOSPADM

## 2019-03-27 RX ORDER — FENTANYL CITRATE 50 UG/ML
INJECTION, SOLUTION INTRAMUSCULAR; INTRAVENOUS PRN
Status: DISCONTINUED | OUTPATIENT
Start: 2019-03-27 | End: 2019-03-27 | Stop reason: SDUPTHER

## 2019-03-27 RX ORDER — SODIUM CHLORIDE, SODIUM LACTATE, POTASSIUM CHLORIDE, CALCIUM CHLORIDE 600; 310; 30; 20 MG/100ML; MG/100ML; MG/100ML; MG/100ML
INJECTION, SOLUTION INTRAVENOUS CONTINUOUS
Status: DISCONTINUED | OUTPATIENT
Start: 2019-03-27 | End: 2019-03-27 | Stop reason: HOSPADM

## 2019-03-27 RX ORDER — HYDRALAZINE HYDROCHLORIDE 20 MG/ML
5 INJECTION INTRAMUSCULAR; INTRAVENOUS EVERY 10 MIN PRN
Status: DISCONTINUED | OUTPATIENT
Start: 2019-03-27 | End: 2019-03-27 | Stop reason: HOSPADM

## 2019-03-27 RX ORDER — LABETALOL HYDROCHLORIDE 5 MG/ML
5 INJECTION, SOLUTION INTRAVENOUS EVERY 10 MIN PRN
Status: DISCONTINUED | OUTPATIENT
Start: 2019-03-27 | End: 2019-03-27 | Stop reason: HOSPADM

## 2019-03-27 RX ORDER — OXYCODONE HYDROCHLORIDE AND ACETAMINOPHEN 5; 325 MG/1; MG/1
1 TABLET ORAL EVERY 6 HOURS PRN
Qty: 20 TABLET | Refills: 0 | Status: SHIPPED | OUTPATIENT
Start: 2019-03-27 | End: 2019-04-01

## 2019-03-27 RX ADMIN — PROPOFOL 50 MG: 10 INJECTION, EMULSION INTRAVENOUS at 12:02

## 2019-03-27 RX ADMIN — SODIUM CHLORIDE, POTASSIUM CHLORIDE, SODIUM LACTATE AND CALCIUM CHLORIDE: 600; 310; 30; 20 INJECTION, SOLUTION INTRAVENOUS at 11:55

## 2019-03-27 RX ADMIN — FENTANYL CITRATE 50 MCG: 50 INJECTION, SOLUTION INTRAMUSCULAR; INTRAVENOUS at 12:01

## 2019-03-27 RX ADMIN — PROPOFOL 180 MCG/KG/MIN: 10 INJECTION, EMULSION INTRAVENOUS at 12:01

## 2019-03-27 RX ADMIN — PROPOFOL 80 MG: 10 INJECTION, EMULSION INTRAVENOUS at 12:01

## 2019-03-27 RX ADMIN — MIDAZOLAM 2 MG: 1 INJECTION INTRAMUSCULAR; INTRAVENOUS at 11:55

## 2019-03-27 RX ADMIN — PROPOFOL 40 MG: 10 INJECTION, EMULSION INTRAVENOUS at 12:03

## 2019-03-27 RX ADMIN — LIDOCAINE HYDROCHLORIDE 100 MG: 20 INJECTION, SOLUTION INTRAVENOUS at 12:01

## 2019-03-27 RX ADMIN — SODIUM CHLORIDE, POTASSIUM CHLORIDE, SODIUM LACTATE AND CALCIUM CHLORIDE: 600; 310; 30; 20 INJECTION, SOLUTION INTRAVENOUS at 11:23

## 2019-03-27 RX ADMIN — FENTANYL CITRATE 50 MCG: 50 INJECTION, SOLUTION INTRAMUSCULAR; INTRAVENOUS at 11:59

## 2019-03-27 RX ADMIN — CEFAZOLIN 2 G: 1 INJECTION, POWDER, FOR SOLUTION INTRAMUSCULAR; INTRAVENOUS at 11:57

## 2019-03-27 ASSESSMENT — PAIN - FUNCTIONAL ASSESSMENT: PAIN_FUNCTIONAL_ASSESSMENT: 0-10

## 2019-03-27 ASSESSMENT — PULMONARY FUNCTION TESTS
PIF_VALUE: 0

## 2019-03-27 ASSESSMENT — PAIN SCALES - GENERAL
PAINLEVEL_OUTOF10: 0

## 2019-03-27 ASSESSMENT — PAIN DESCRIPTION - DESCRIPTORS: DESCRIPTORS: DISCOMFORT

## 2019-03-27 NOTE — H&P
Transportation needs:     Medical: Not on file     Non-medical: Not on file   Tobacco Use    Smoking status: Light Tobacco Smoker     Types: Cigars    Smokeless tobacco: Never Used    Tobacco comment: occas cigars   Substance and Sexual Activity    Alcohol use: Yes     Comment: 1-2 days per week    Drug use: Yes     Types: Marijuana    Sexual activity: Not on file   Lifestyle    Physical activity:     Days per week: Not on file     Minutes per session: Not on file    Stress: Not on file   Relationships    Social connections:     Talks on phone: Not on file     Gets together: Not on file     Attends Synagogue service: Not on file     Active member of club or organization: Not on file     Attends meetings of clubs or organizations: Not on file     Relationship status: Not on file    Intimate partner violence:     Fear of current or ex partner: Not on file     Emotionally abused: Not on file     Physically abused: Not on file     Forced sexual activity: Not on file   Other Topics Concern    Not on file   Social History Narrative    Not on file       Review of Systems:   CONSTITUTIONAL:  negative  EYES:  negative  HEENT:  negative  RESPIRATORY:  negative  CARDIOVASCULAR:  negative  GASTROINTESTINAL:  negative  GENITOURINARY:  negative  INTEGUMENT/BREAST:  negative  HEMATOLOGIC/LYMPHATIC:  negative  ALLERGIC/IMMUNOLOGIC:  negative  ENDOCRINE:  negative  MUSCULOSKELETAL:  positive for  Pain, redness, edema, surrounding hardware.   NEUROLOGICAL:  negative  BEHAVIOR/PSYCH:  negative    Physical Exam:  Vitals:    03/22/19 0852 03/27/19 1059   BP:  122/84   Pulse:  70   Resp:  20   Temp:  98 °F (36.7 °C)   TempSrc:  Skin   SpO2:  96%   Weight: 260 lb (117.9 kg) 255 lb (115.7 kg)   Height: 5' 10\" (1.778 m) 5' 10\" (1.778 m)       CONSTITUTIONAL:  awake, alert, cooperative, no apparent distress, and appears stated age  EYES:  Lids and lashes normal, pupils equal, round and reactive to light, extra ocular muscles

## 2019-03-29 NOTE — OP NOTE
Certainly, there is the possibility of an  associated infection. This is the reason we put him on antibiotics. I  did allow the sites to punctate bleed over the next couple of minutes. The extremity was wiped clean of Betadine and blood and then I  manipulated the left wrist.  The patient essentially had no range of  motion prior to the procedure because of the pins. After mild  manipulation, 15 degrees of extension and 15 degrees of flexion, _____  easily accomplished. I then dressed the wound with Adaptic, triple  antibiotic ointment, Kerlix gauze roll, followed by a 4-inch Ace  bandage. The patient tolerated the operation well and was sent to the  postanesthesia care unit in stable and fair condition, escorted by  myself, RN, CRNA.         Bobby Arita MD    D: 03/28/2019 17:22:02       T: 03/29/2019 1:42:32     [email or web address]  Job#: [id number]     Doc#: [id number]    CC:  Aaliyah Saab., DO Nicolás Day DO

## 2019-04-18 ENCOUNTER — EVALUATION (OUTPATIENT)
Dept: OCCUPATIONAL THERAPY | Age: 54
End: 2019-04-18
Payer: COMMERCIAL

## 2019-04-18 DIAGNOSIS — S69.92XA INJURY OF INTERCARPAL LIGAMENT OF LEFT WRIST WITHOUT INSTABILITY, INITIAL ENCOUNTER: Primary | ICD-10-CM

## 2019-04-18 DIAGNOSIS — G56.22 CUBITAL TUNNEL SYNDROME, LEFT: ICD-10-CM

## 2019-04-18 PROCEDURE — 97166 OT EVAL MOD COMPLEX 45 MIN: CPT | Performed by: OCCUPATIONAL THERAPIST

## 2019-04-18 NOTE — PROGRESS NOTES
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       Insurance/Certification information:  Medical [place]  Referring Physician:  Dr Cintia Alarcon, [address] [place] [address]  Date of Surgery/Injury: surgery 19 and 3-27-19  Plan of care signed (Y/N):  N  Visit# / total visits: 1 / up to 10 visits    Past Medical History:   Past Medical History:   Diagnosis Date    Diverticulosis     Gout     Hyperlipidemia     Hypertension     Thyroid disease      Past Surgical History:   Past Surgical History:   Procedure Laterality Date    DUPUYTRENS CONTRACTURE SURGERY Left 2019    LEFT WRIST 4-BONE FUSION, LEFT WRIST S-BONE EXCISION, LEFT UPPER EXTREMITY CUBITAL TUNNEL RELEASE performed by Sharonda Perry MD at [facility] Left     wrist    KNEE SURGERY Right     [address] [facility]. NASAL BONE SEPTUM AND SPLINT STABILIZATN      REMOVE HARDWARE HAND Left 3/27/2019    LEFT WRIST  HARDWARE REMOVAL performed by Sharonda Perry MD at [address] Bilateral     WRIST FUSION Left 2019    left wrist 4 bone fusion, left wrist S bone excision, left upper extremity cubital tunnel release    WRIST SURGERY Left 2019    removal hardware       Reason for Referral: Patient presents for outpatient Occupational Therapy with a history of left hand weakness, paresthesias and progressive left wrist pain. Pt states he had a left hand scaphoid fracture that was addressed in . 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 Fusion of the capitate/ lunate/ triquetrum and hamate, carpal tunnel release and scaphoid excision was required on 19 . In addition, he was X MCPs-  8 3/4 \"    UE Coordination: WFL    Assessment of current deficits   Functional mobility []  ADLs [x] Strength [x]  Cognition []  Functional transfers  [] IADLs [x] Safety Awareness []  Endurance []  Fine Motor Coordination [] Balance [] Vision/perception [] Sensation [x]   Gross Motor Coordination [] ROM [x]  Work []  Leisure[x]     Eval Complexity: Moderate complexity  Profile and History- interview, OP notes, MD H&P  Assessment of Occupational Performance and Identification of Deficits- 6 performance deficits  Clinical Decision Making- Modifications needed/ co-morbidity CT- cubital tunnel, DJD    Rehab Potential:   [x] Good  [] Fair  [] Poor   Suggested Professional Referral: [x] No  [] Yes:  Barriers to Goal Achievement[de-identified]   [x] No  [] Yes:  Domestic Concerns:     [x] No  [] Yes:    Goal Formulation: Patient \" I want to golf again\"  Time In: 8762  Time Out: 8877  Timed Code Treatment Minutes: 60      PLAN     Plan   Plan: Plan of care initiated. Frequency Pt will be seen 2x a week for 5 weeks or 10 sessions as needed. Treatment to include:   [x] Instruction in HEP   Modalities:  [x] Therapeutic Exercise [x] Ultrasound   [] Electrical Stimulation/Attended  [x] PROM/Stretching             [x] Fluidotherapy          [x]  Paraffin                   [x]AAROM  [x] AROM             [] Iontophoresis: 4 mg/mL;  Dexamethasone Sodium           [] Desensitization                           Phosphate 40-80 mAmin     [] Neuromuscular Re-education    [] Splinting    [x] Therapeutic Activity            [] Pain Management with/without modalities PRN        [x] Manual Therapy/Fascial release   [] ADL/IADL re-training        [x] Tendon Glides                   []Joint Protection/Training  []Ergonomics       [] Joint Mobilization  []Adaptive Equipment Assessment/Training          [x] Manual Edema Mobilization    [] Energy Conservation/Work Simplification  [] GM/FM Coordination  []  Safety retraining/education per individual diagnosis/goals      GOALS (Long term same as Short term):  1) Patient will demonstrate good understanding of home program(exercises/activities/diagnosis/prognosis/goals) with good accuracy. 2) Patient will demonstrate increased active/passive range of motion of their left wrist/ thumb to [facility] for ADL/IADL completion. 3) Patient will demonstrate increased elbow/ wrist strength to 4/5 with /pinch strength to [facility] for the left hand. 4) Patient to report decreased pain in their affected left distal upper extremity from 5/10 with light ROM  to 3/10 or less with resistive functional use. 5) Patient will be knowledgeable of edema control techniques as evident with decreases from moderate to mild. 6) Patient will report ADL / IADL and leisure functions to [facility] with good effective use of the left arm. Patient. Education:  [x] Plans/Goals, Risks/Benefits discussed  [] Home exercise program  Method of Education: [x] Verbal  [] Demo  [] Written  Comprehension of Education:  [x] Verbalizes understanding. [] Demonstrates understanding. [] Needs Review. [] Demonstrates/verbalizes understanding of HEP/Ed previously given. Patient understands diagnosis/prognosis and consents to treatment, plan and goals: [x] Yes    [] No         Electronically signed by: Hermelinda Habermann, OT/EBONI    [id number]      NAOMI Certification / Comments     Frequency/Duration 2 / week for 10 visits. Certification period From: 4-18-19  To: 5-24-19    I have reviewed the Plan of Care established for skilled therapy services and certify that the services are required and that they will be provided while the patient is under my care.     Physician's Comments/Revisions:      Physician's Printed Name:        Dr Mike Lopez                                   Physician's Signature:                                                               Date:       Please review Patient's OT evaluation and if you agree sign/date and fax back to us at our [facility] fax # [phone].

## 2019-04-20 PROBLEM — S69.92XA: Status: ACTIVE | Noted: 2019-04-20

## 2019-04-20 PROBLEM — G56.22 CUBITAL TUNNEL SYNDROME, LEFT: Status: ACTIVE | Noted: 2019-04-20

## 2019-04-23 ENCOUNTER — TREATMENT (OUTPATIENT)
Dept: OCCUPATIONAL THERAPY | Age: 54
End: 2019-04-23
Payer: COMMERCIAL

## 2019-04-23 DIAGNOSIS — S69.92XA INJURY OF INTERCARPAL LIGAMENT OF LEFT WRIST WITHOUT INSTABILITY, INITIAL ENCOUNTER: Primary | ICD-10-CM

## 2019-04-23 PROCEDURE — 97110 THERAPEUTIC EXERCISES: CPT | Performed by: OCCUPATIONAL THERAPIST

## 2019-04-23 PROCEDURE — 97022 WHIRLPOOL THERAPY: CPT | Performed by: OCCUPATIONAL THERAPIST

## 2019-04-23 PROCEDURE — 97140 MANUAL THERAPY 1/> REGIONS: CPT | Performed by: OCCUPATIONAL THERAPIST

## 2019-04-23 NOTE — PROGRESS NOTES
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 Therapist encouraged him to keep it more at the 4- 5 at the most/10 level. Subjective: \"I hope this muscle comes back  (adductor m. In thumb). This thumb doesn't move well either. \"     Objective:  Updated POC to be completed by last visit. .    INTERVENTION: COMPLETED: SPECIFICS/COMMENTS:   Modality:     fluiotherapy X 12 min. For soft tissue warm- up while intermittenlty moving his wrist and thumb        AROM:     L wrist and thumb x All planes after heat and PROM stretch   Wrist er ciser X 5 min.  donald well         AAROM:               PROM/Stretching:     L wrist and thumb all planes x         Scar Mass/Edema Control:     Scar and retrograde x         Strengthening:               Other:     HEP x See attached sheet. AROM for  Wrist flex. Ext tenodesis, RD- UD , thumb rad. Abduc, and finger abduc/adduc. .           Assessment/Comments: Pt is making Good progress toward stated plan of care.    -Rehab Potential: Good  -Requires OT Follow Up: Yes  Time In:  2:15 pm            Time Out:  3:10 pm             Visit #: 2    Treatment Charges: Mins Units   Modalities/fluioth 12 1   Ther Exercise 20 1   Manual Therapy 25 2   Thera Activities     ADL/Home Mgt      Neuro Re-education     Gait Training     Group Therapy Non-Billable Service Time     Other     Total Time/Units 55 4       -Response to Treatment: Pt stated he had slight ^ in pain by end of session. He did not want ice - may use at home is needed. Therapist told him not to push his ROM as he is moving well - try to keep pain at 4/10 level when doing exercises. He appeared to understand all exercises on his HEP. Will progress as tolerated. Goals: Goals for pt can be see on initial eval occurring on 4/18/2019    Plan:   [x]  Continues Plan of care: Treatment covered based on POC and graduated to patient's progress. Pt education continues at each visit to obtain maximum benefits from skilled OT intervention.   []  Alter Plan of care:   []  Discharge:      Yesy Kholer OT/EBONI, CHT

## 2019-04-25 ENCOUNTER — TREATMENT (OUTPATIENT)
Dept: OCCUPATIONAL THERAPY | Age: 54
End: 2019-04-25
Payer: COMMERCIAL

## 2019-04-25 DIAGNOSIS — S69.92XA INJURY OF INTERCARPAL LIGAMENT OF LEFT WRIST WITHOUT INSTABILITY, INITIAL ENCOUNTER: Primary | ICD-10-CM

## 2019-04-25 PROCEDURE — 97110 THERAPEUTIC EXERCISES: CPT | Performed by: OCCUPATIONAL THERAPIST

## 2019-04-25 PROCEDURE — 97140 MANUAL THERAPY 1/> REGIONS: CPT | Performed by: OCCUPATIONAL THERAPIST

## 2019-04-25 PROCEDURE — 97022 WHIRLPOOL THERAPY: CPT | Performed by: OCCUPATIONAL THERAPIST

## 2019-04-25 NOTE — PROGRESS NOTES
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 Therapist encouraged him to keep it more at the 4- 5 at the most/10 level. Subjective: \"My wrist still hurts a lot - but I think I over do it. I think I'm gonna wear my wrist band at night - that may help . \"     Objective:  Updated POC to be completed by visit 10. .    INTERVENTION: COMPLETED: SPECIFICS/COMMENTS:   Modality:     fluiotherapy X 12 min. For soft tissue warm- up while intermittenlty moving his wrist and thumb   ice X 10 min. End of session to decrease pain. AROM:     L wrist and thumb/ finger abd/adduction x All planes after heat and PROM stretch   Card turning  X 25 cards For forearm rotation stretch. Wrist er ciser X 5 min.  donald well         AAROM:     UBE X 5 min. 2.5 forw and 2.5 back- donald well        PROM/Stretching:     L wrist and thumb all planes x         Scar Mass/Edema Control:     Scar and retrograde x         Strengthening:               Other:     HEP x See attached sheet. AROM for  Wrist flex. Ext tenodesis, RD- UD , thumb rad. Abduc, and finger abduc/adduc. .           Assessment/Comments: Pt is making Good progress toward stated plan of care.    -Rehab Potential: Good  -Requires OT Follow Up: Yes  Time In:  8:15 am           Time Out:  9:15 am            Visit #: 3    Treatment Charges: Mins Units   Modalities/fluioth 10 1   Ther Exercise 20 1   Manual Therapy 25 2   Thera Activities     ADL/Home Mgt      Neuro Re-education     Gait Training     Group Therapy     Non-Billable Service Time/ice 10    Other     Total Time/Units 60 4       -Response to Treatment: Pt stated he had slight ^ in pain by end of session. Ice end of session to decrease pain. Therapist told him not to push his ROM as he is moving well - try to keep pain at 4/10 level when doing exercises. He appeared to understand all exercises on his HEP. Will progress as tolerated. Goals: Goals for pt can be see on initial eval occurring on 4/18/2019    Plan:   [x]  Continues Plan of care: Treatment covered based on POC and graduated to patient's progress. Pt education continues at each visit to obtain maximum benefits from skilled OT intervention.   []  Alter Plan of care:   []  Discharge:      Carlos Dan OT/EBONI, CHT  OT 82

## 2019-04-30 ENCOUNTER — TREATMENT (OUTPATIENT)
Dept: OCCUPATIONAL THERAPY | Age: 54
End: 2019-04-30
Payer: COMMERCIAL

## 2019-04-30 DIAGNOSIS — S69.92XA INJURY OF INTERCARPAL LIGAMENT OF LEFT WRIST WITHOUT INSTABILITY, INITIAL ENCOUNTER: Primary | ICD-10-CM

## 2019-04-30 DIAGNOSIS — G56.22 CUBITAL TUNNEL SYNDROME, LEFT: ICD-10-CM

## 2019-04-30 PROCEDURE — 97022 WHIRLPOOL THERAPY: CPT | Performed by: OCCUPATIONAL THERAPIST

## 2019-04-30 PROCEDURE — 97110 THERAPEUTIC EXERCISES: CPT | Performed by: OCCUPATIONAL THERAPIST

## 2019-04-30 PROCEDURE — 97140 MANUAL THERAPY 1/> REGIONS: CPT | Performed by: OCCUPATIONAL THERAPIST

## 2019-04-30 NOTE — PROGRESS NOTES
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 Therapist encouraged him to keep it more at the 4- 5 at the most/10 level. Subjective: \"My wrist always hurts and the thumb is hurting more today here ( Aia 16 jt area) . \"     Objective:  Updated POC to be completed by visit 10. .    INTERVENTION: COMPLETED: SPECIFICS/COMMENTS:   Modality:     fluiotherapy X 12 min. For soft tissue warm- up while intermittenlty moving his wrist and thumb   US X 7 min. To CMC jt L thumb - 20% 3.3 Mhz, 1.0 intensity   ice  10 min. End of session to decrease pain. AROM:     L wrist and thumb/ finger abd/adduction x All planes after heat and PROM stretch   Card turning   25 cards For forearm rotation stretch. Wrist er ciser X 10 rep's  donald well         AAROM:     UBE X 5 min. 2.5 forw and 2.5 back- donald well   4# ball X 10 rep's ea Elbow ext/flex - while flexed sup/pronate and then elbow ext/flex then wrist flex/ext. PROM/Stretching:     L wrist and thumb all planes x         Scar Mass/Edema Control:     Scar and retrograde x         Strengthening:               Other:     HEP x See attached sheet. AROM for  Wrist flex. Ext tenodesis, RD- UD , thumb rad. Abduc, and finger abduc/adduc. .           Assessment/Comments: Pt is making Good progress toward stated plan of care.    -Rehab

## 2019-05-02 ENCOUNTER — TREATMENT (OUTPATIENT)
Dept: OCCUPATIONAL THERAPY | Age: 54
End: 2019-05-02
Payer: COMMERCIAL

## 2019-05-02 DIAGNOSIS — G56.22 CUBITAL TUNNEL SYNDROME, LEFT: ICD-10-CM

## 2019-05-02 DIAGNOSIS — S69.92XA INJURY OF INTERCARPAL LIGAMENT OF LEFT WRIST WITHOUT INSTABILITY, INITIAL ENCOUNTER: Primary | ICD-10-CM

## 2019-05-02 PROCEDURE — 97110 THERAPEUTIC EXERCISES: CPT | Performed by: OCCUPATIONAL THERAPIST

## 2019-05-02 PROCEDURE — 97140 MANUAL THERAPY 1/> REGIONS: CPT | Performed by: OCCUPATIONAL THERAPIST

## 2019-05-02 PROCEDURE — 97022 WHIRLPOOL THERAPY: CPT | Performed by: OCCUPATIONAL THERAPIST

## 2019-05-02 NOTE — PROGRESS NOTES
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 \"     Objective:  Updated POC to be completed by visit 10. .    INTERVENTION: COMPLETED: SPECIFICS/COMMENTS:   Modality:     fluiotherapy X 12 min. For soft tissue warm- up while intermittenlty moving his wrist and thumb   US X 10min. To CMC jt L thumb - 50% 3.3 Mhz, .8 intensity- helpful to decrease edema x wrist   ice     AROM:     L wrist and thumb/ finger abd/adduction x All planes after heat and PROM stretch   Card turning   For forearm rotation stretch. Wrist er ciser X 10 rep's  donald well         AAROM:     UBE X 5 min. 2.5 forw and 2.5 back- donald well   4# ball X 10 rep's ea Elbow ext/flex - while flexed sup/pronate and then elbow ext/flex then wrist flex/ext. - sore   PROM/Stretching:     L wrist and thumb all planes x         Scar Mass/Edema Control:     Scar and retrograde x         Strengthening:               Other:     HEP x See attached sheet. AROM for  Wrist flex. Ext tenodesis, RD- UD , thumb rad. Abduc, and finger abduc/adduc. .           Assessment/Comments: Pt is making Good progress toward stated plan of care.  Status retested as follows:    AROM  Wrist flexion 0-50 from 0-40  Wrist extension 0-40 unchanged  RD 0-8  UD 0-20  Thumb flexion -1 cm from -2 cm  IP 0-45 from 0-30  Thumb radial ext 0-50 unchanged  Thumb ABD 0-50 from 0-34    -Rehab Potential: Good  -Requires OT Follow Up: Yes  Time In:  11:05 am           Time Out:  12:00  am            Visit #: 5    Treatment Charges: Mins Units   Modalities/fluioth/ US 10/ 10 1   Ther Exercise 25 2   Manual Therapy 10 1   Thera Activities     ADL/Home Mgt      Neuro Re-education     Gait Training     Group Therapy     Non-Billable Service Time/ice     Other     Total Time/Units 55 4       -Response to Treatment:  Pain levels are decreased and AROM is increased today. Good progress continues. Goals: Goals for pt can be seen on initial eval occurring on 4/18/2019    Plan:   [x]  Continue Plan of care: Treatment covered based on POC and graduated to patient's progress. Pt education continues at each visit to obtain maximum benefits from skilled OT intervention.   []  [facility] of care:   []  Discharge:      Aparna Cross OT/EBONI, [id number]

## 2019-05-07 ENCOUNTER — TREATMENT (OUTPATIENT)
Dept: OCCUPATIONAL THERAPY | Age: 54
End: 2019-05-07
Payer: COMMERCIAL

## 2019-05-07 DIAGNOSIS — G56.22 CUBITAL TUNNEL SYNDROME, LEFT: ICD-10-CM

## 2019-05-07 DIAGNOSIS — S69.92XA INJURY OF INTERCARPAL LIGAMENT OF LEFT WRIST WITHOUT INSTABILITY, INITIAL ENCOUNTER: Primary | ICD-10-CM

## 2019-05-07 PROCEDURE — 97140 MANUAL THERAPY 1/> REGIONS: CPT | Performed by: OCCUPATIONAL THERAPIST

## 2019-05-07 PROCEDURE — 97035 APP MDLTY 1+ULTRASOUND EA 15: CPT | Performed by: OCCUPATIONAL THERAPIST

## 2019-05-07 PROCEDURE — 97110 THERAPEUTIC EXERCISES: CPT | Performed by: OCCUPATIONAL THERAPIST

## 2019-05-07 NOTE — PROGRESS NOTES
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  Subjective: \" The Dr. Dana Hernandez I could start strengthening. My wrist still hurts- the last 2 days it's hurt more. \"     Objective:  Updated POC to be completed by visit 10. .    INTERVENTION: COMPLETED: SPECIFICS/COMMENTS:   Modality:     fluiotherapy X 12 min. For soft tissue warm- up while intermittenlty moving his wrist and thumb   US X 10min. To CMC jt L thumb and wrist dorsally and ulnarly- 50% 3.3 Mhz, .8 intensity- helpful to decrease edema x wrist   ice     AROM:     L wrist and thumb/ finger abd/adduction x All planes after heat and PROM stretch   Card turning   For forearm rotation stretch. Wrist er ciser 10 rep's  donald well         AAROM:     UBE X 5 min. 2.5 forw and 2.5 back- donald well   4# ball  10 rep's ea Elbow ext/flex - while flexed sup/pronate and then elbow ext/flex then wrist flex/ext. - sore   PROM/Stretching:     L wrist and thumb all planes x         Scar Mass/Edema Control:     Scar and retrograde x All scar areas - tender at elbow. Strengthening:     Pulleys/ cables X 15 rep;s ea Cable 25# for triceps pull down, pullies with 10 # low rows and middle rows. Easy putty x Pt using play dough at home- roll and 3 pt pinch x's 3 sets. Getting a easy squeeze ball. Other:     HEP x See attached sheet.  AROM for  Wrist flex. Ext tenodesis, RD- UD , thumb rad. Abduc, and finger abduc/adduc. .           Assessment/Comments: Pt is making Good progress toward stated plan of care. Tolerated added strengthening today with slight ^ in pain- pt felt more muscle fatigue. Measured hand strength today for initial measurements as follows:    Dynamometer (setting 2):                              Left: 16#                                               Right:  75#                            Pinch Meter:              Lateral: Left= 4.5#,       Right= 17.5#              Palmar 3 point: Left= 2.5#  ^ pain,     Right= 17#      -Rehab Potential: Good  -Requires OT Follow Up: Yes  Time In:  11:10 am           Time Out:  12:10  am            Visit #: 6    Treatment Charges: Mins Units   Modalities/fluioth/ US 10/ 10 1   Ther Exercise 30 2   Manual Therapy 10 1   Thera Activities     ADL/Home Mgt      Neuro Re-education     Gait Training     Group Therapy     Non-Billable Service Time/ice     Other     Total Time/Units 60 4       -Response to Treatment: Pt's pain continues in his wrist area mostly - hypersensitivity and tenderness in the elbow scar area. Progressed with some strengthening today per 's orders from his appointment last week. Will progress as tolerated. Good progress continues. Goals: Goals for pt can be seen on initial eval occurring on 4/18/2019    Plan:   [x]  Continue Plan of care: Treatment covered based on POC and graduated to patient's progress. Pt education continues at each visit to obtain maximum benefits from skilled OT intervention.   []  Alter Plan of care:   []  Discharge:      Suni Bill OT/L, CHT

## 2019-05-14 ENCOUNTER — TREATMENT (OUTPATIENT)
Dept: OCCUPATIONAL THERAPY | Age: 54
End: 2019-05-14
Payer: COMMERCIAL

## 2019-05-14 DIAGNOSIS — G56.22 CUBITAL TUNNEL SYNDROME, LEFT: ICD-10-CM

## 2019-05-14 DIAGNOSIS — S69.92XA INJURY OF INTERCARPAL LIGAMENT OF LEFT WRIST WITHOUT INSTABILITY, INITIAL ENCOUNTER: Primary | ICD-10-CM

## 2019-05-14 PROCEDURE — 97110 THERAPEUTIC EXERCISES: CPT | Performed by: OCCUPATIONAL THERAPIST

## 2019-05-14 PROCEDURE — 97018 PARAFFIN BATH THERAPY: CPT | Performed by: OCCUPATIONAL THERAPIST

## 2019-05-14 PROCEDURE — 97140 MANUAL THERAPY 1/> REGIONS: CPT | Performed by: OCCUPATIONAL THERAPIST

## 2019-05-14 PROCEDURE — 97530 THERAPEUTIC ACTIVITIES: CPT | Performed by: OCCUPATIONAL THERAPIST

## 2019-05-16 ENCOUNTER — TREATMENT (OUTPATIENT)
Dept: OCCUPATIONAL THERAPY | Age: 54
End: 2019-05-16
Payer: COMMERCIAL

## 2019-05-16 DIAGNOSIS — S69.92XA INJURY OF INTERCARPAL LIGAMENT OF LEFT WRIST WITHOUT INSTABILITY, INITIAL ENCOUNTER: Primary | ICD-10-CM

## 2019-05-16 DIAGNOSIS — G56.22 CUBITAL TUNNEL SYNDROME, LEFT: ICD-10-CM

## 2019-05-16 PROCEDURE — 97110 THERAPEUTIC EXERCISES: CPT | Performed by: OCCUPATIONAL THERAPIST

## 2019-05-16 PROCEDURE — 97018 PARAFFIN BATH THERAPY: CPT | Performed by: OCCUPATIONAL THERAPIST

## 2019-05-16 PROCEDURE — 97140 MANUAL THERAPY 1/> REGIONS: CPT | Performed by: OCCUPATIONAL THERAPIST

## 2019-05-16 NOTE — PROGRESS NOTES
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 I got really busy\". Objective:  Updated POC to be completed by visit 10. .    INTERVENTION: COMPLETED: SPECIFICS/COMMENTS:   Modality:     Paraffin Tx X 10 min. For soft tissue preconditioning prior to exercise   US X 10min. To CMC jt L thumb and wrist dorsally and ulnarly- 50% 3.3 Mhz, .8 intensity- helpful to decrease edema x wrist   ice     AROM:     L wrist and thumb/ finger abd/adduction x All planes after heat and PROM stretch   Card turning   For forearm rotation stretch. Wrist er ciser 10 rep's  donald well         AAROM:     UBE X 5 min. 3 min  forw and 3 back- donald well   4# ball  10 rep's ea Elbow ext/flex - while flexed sup/pronate and then elbow ext/flex then wrist flex/ext. PROM/Stretching:     L wrist and thumb all planes x         Scar Mass/Edema Control:     Scar and retrograde x All scar areas - tender at elbow. Strengthening:     Lat pull down and tricep ext X  25#/ 40# 1-15 each   Column pulley x 10-# 1-15 high and 1 set low   Wrist PREs x 1# 1-15 flex/ ext   Medium soft blend theraputty x roll and 3 pt pinch x's 3 sets. Gentle gripping- putty provided for home use   Other:     HEP x See attached sheet. AROM for  Wrist flex.  Ext tenodesis, RD- UD , thumb rad. Abduc, and finger abduc/adduc. .           Assessment/Comments: Pt is making Good progress toward stated plan of care. Strengthening increased and tolerated well today. Pt only complains of fatigue.    -Rehab Potential: Good  -Requires OT Follow Up: Yes  Time In:  11:00 am           Time Out:  12:10  am            Visit #: 6    Treatment Charges: Mins Units   Modalities/Paraffin/ US 10/ 10 1   Ther Exercise 30 2   Manual Therapy 10 1   Thera Activities 10 1   ADL/Home Mgt      Neuro Re-education     Gait Training     Group Therapy     Non-Billable Service Time/ice     Other     Total Time/Units 70 5       -Response to Treatment: Progress continues. Goals: Goals for pt can be seen on initial eval occurring on 4/18/2019    Plan:   [x]  Continue Plan of care: Treatment covered based on POC and graduated to patient's progress. Pt education continues at each visit to obtain maximum benefits from skilled OT intervention.   []  [facility] of care:   []  Discharge:      Sylvia Bautista OT/L, [id number]

## 2019-05-16 NOTE — PROGRESS NOTES
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 Objective:  Updated POC to be completed by visit 10. .    INTERVENTION: COMPLETED: SPECIFICS/COMMENTS:   Modality:     Paraffin Tx X 10 min. For soft tissue preconditioning prior to exercise   US X 10min. To CMC jt L thumb and wrist dorsally and ulnarly- 50% 3.3 Mhz, .8 intensity- helpful to decrease edema x wrist   ice     AROM:     L wrist and thumb/ finger abd/adduction x All planes after heat and PROM stretch   Card turning   For forearm rotation stretch. Wrist er ciser 10 rep's  donald well    Mariana dowel ex/ green  Supination/ pronation to tolerance   AAROM:     UBE X 6 min. 3 min  forw and 3 back- donald well   4# ball  10 rep's ea Elbow ext/flex - while flexed sup/pronate and then elbow ext/flex then wrist flex/ext. PROM/Stretching:     L wrist and thumb all planes x         Scar Mass/Edema Control:     Scar and retrograde x All scar areas - tender at elbow. Strengthening:     Lat pull down and tricep ext X  25#/ 40# 1-20 each   Column pulley x 15-# 1-15 high and 1 set low   Wrist PREs x 1# 1-15 flex/ ext   Medium soft blend theraputty x roll and 3 pt pinch x's 3 sets. Gentle gripping- putty provided for home use   Other:     HEP x See attached sheet. AROM for  Wrist flex. Ext tenodesis, RD- UD , thumb rad. Abduc, and finger abduc/adduc. .           Assessment/Comments: Pt is making Good progress toward stated plan of care. Strengthening increased and tolerated well today. Pt only complains of fatigue.    -Rehab Potential: Good  -Requires OT Follow Up: Yes  Time In:  11:05 am           Time Out:  12:05  am            Visit #: 8    Treatment Charges: Mins Units   Modalities/Paraffin/ US 10/ 10 1   Ther Exercise 30 2   Manual Therapy 10 1   Thera Activities     ADL/Home Mgt      Neuro Re-education     Gait Training     Group Therapy     Non-Billable Service Time/ice     Other     Total Time/Units 60 4       -Response to Treatment: Progress continues. Goals: Goals for pt can be seen on initial eval occurring on 4/18/2019    Plan:   [x]  Continue Plan of care: Treatment covered based on POC and graduated to patient's progress. Pt education continues at each visit to obtain maximum benefits from skilled OT intervention.   []  [address] of care:   []  Discharge:      Spenser Merino OT/L, [id number]

## 2019-05-29 ENCOUNTER — EVALUATION (OUTPATIENT)
Dept: PHYSICAL THERAPY | Age: 54
End: 2019-05-29
Payer: COMMERCIAL

## 2019-05-29 ENCOUNTER — TREATMENT (OUTPATIENT)
Dept: OCCUPATIONAL THERAPY | Age: 54
End: 2019-05-29
Payer: COMMERCIAL

## 2019-05-29 DIAGNOSIS — S69.92XA INJURY OF INTERCARPAL LIGAMENT OF LEFT WRIST WITHOUT INSTABILITY, INITIAL ENCOUNTER: Primary | ICD-10-CM

## 2019-05-29 DIAGNOSIS — M54.50 MIDLINE LOW BACK PAIN WITHOUT SCIATICA, UNSPECIFIED CHRONICITY: ICD-10-CM

## 2019-05-29 DIAGNOSIS — G56.22 CUBITAL TUNNEL SYNDROME, LEFT: ICD-10-CM

## 2019-05-29 PROCEDURE — 97022 WHIRLPOOL THERAPY: CPT | Performed by: OCCUPATIONAL THERAPIST

## 2019-05-29 PROCEDURE — 97140 MANUAL THERAPY 1/> REGIONS: CPT | Performed by: OCCUPATIONAL THERAPIST

## 2019-05-29 PROCEDURE — 97110 THERAPEUTIC EXERCISES: CPT | Performed by: OCCUPATIONAL THERAPIST

## 2019-05-29 PROCEDURE — 97161 PT EVAL LOW COMPLEX 20 MIN: CPT | Performed by: PHYSICAL THERAPIST

## 2019-05-29 NOTE — PROGRESS NOTES
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 My wrist is pretty sore\". Objective:  Updated POC to be completed by visit 10. .    INTERVENTION: COMPLETED: SPECIFICS/COMMENTS:   Modality:     Fluidotherapy with AROM X 10 min. For soft tissue preconditioning prior to exercise   US X 10min. To CMC jt L thumb and wrist dorsally and ulnarly- 50% 3.3 Mhz, .8 intensity- helpful to decrease discomfort, edema x wrist   ice     AROM:     L wrist and thumb/ finger abd/adduction x All planes after heat and PROM stretch   Card turning   For forearm rotation stretch. Wrist er ciser 10 rep's  donald well    Mariana dowel ex/ green  Supination/ pronation to tolerance   AAROM:     UBE  3 min  forw and 3 back- donald well   4# ball  Elbow ext/flex - while flexed sup/pronate and then elbow ext/flex then wrist flex/ext. PROM/Stretching:     L wrist and thumb all planes x         Scar Mass/Edema Control:     Scar and retrograde x All scar areas - tender at elbow. Strengthening:     Lat pull down and tricep ext  25#/ 40# 1-20 each   Column pulley  15-# 1-15 high and 1 set low   Wrist PREs x 1# 1-15 flex/ ext   Medium soft blend theraputty x roll and 3 pt pinch x's 3 sets. Gentle gripping- putty provided for home use   Other:     HEP x See attached sheet.  AROM for Wrist flex. Ext tenodesis, RD- UD , thumb rad. Abduc, and finger abduc/adduc. .           Assessment/Comments: Pt is making Good progress toward stated plan of care. However, overall tolerance for activity is decreased due to wrist soreness.     -Rehab Potential: Good  -Requires OT Follow Up: Yes  Time In:  11:10 am           Time Out:  12:05  am            Visit #: 9    Treatment Charges: Mins Units   Modalities/Fluido/ US 10/ 10 1   Ther Exercise 25 2   Manual Therapy 10 1   Thera Activities     ADL/Home Mgt      Neuro Re-education     Gait Training     Group Therapy     Non-Billable Service Time/ice     Other     Total Time/Units 55 4       -Response to Treatment: Progress continues. Goals: Goals for pt can be seen on initial eval occurring on 4/18/2019    Plan:   [x]  Continue Plan of care: Treatment covered based on POC and graduated to patient's progress. Pt education continues at each visit to obtain maximum benefits from skilled OT intervention.   []  [facility] of care:   []  Discharge:      Eboni Parks OT/L, [id number]

## 2019-05-29 NOTE — PROGRESS NOTES
Physical Therapy Treatment Note    Date: 2019  Patient Name: Alisson Velez  : 1965   MRN: [id number]  DOInjury: 3/1/19  DOSx: N/A  Referring Provider: No referring provider defined for this encounter. Medical Diagnosis:    Diagnosis Orders   1. Midline low back pain without sciatica, unspecified chronicity         Outcome Measure:      S: see eval  O:  Time 915     Visit  Repeat outcome measure at mid point and end. Pain 10/10     ROM      Modalities  x    MH + ES            Exercise      ALL EXERCISE DONE WITH DRAW-IN TECHNIQUE                            Functional activities To aid in reaching , pushing, pulling tasks at home     ROWS: H  \"    ROWS: M  \"    ROWS: L  \"    Obliques - high  \"    Obliques - low  \"     THEREX     Nustep    x    Punches  x    Lat pulldowns      Triceps ext standing      Marching      Pelvic Tilts 2x10  TE   Trunk ext TB  x    Trunk flex TB  x    Hip abd  x    Hip EXT  x    TG Squats  x                A:  Tolerated well. Above added to written HEP.   P: Continue with rehab plan  [facility] PT    Treatment Charges: Mins Units   Initial Evaluation  1   Re-Evaluation     Ther Exercise         TE     Manual Therapy     MT     Ther Activities        TA     Gait Training          GT     Neuro Re-education NR     Modalities     Non-Billable Service Time     Other     Total Time/Units 0 1

## 2019-05-29 NOTE — PROGRESS NOTES
[facility] OUTPATIENT REHABILITATION  PHYSICAL THERAPY INITIAL EVALUATION         Date:  2019   Patient: Elodia Motta  : 1965  MRN: [id number]  Referring Provider: No referring provider defined for this encounter. Medical Diagnosis:   M54.5 Low Back Pain    SUBJECTIVE:     Onset date: 3/6/19    Onset: Insidious onset    Mechanism of Injury: Pt reports that he gradually had LBP following surgery on the to L wrist. He reports that he had chiropractic tx s benefit. He reports that he had this issue last year and got better c rest and ice. He states that these sx are different and denies any radicular pain. Previous PT: yes - did not help    Medical Management for Current Problem: chiropractic, OTC meds     Chief complaint: pain and decreased motion    Behavior: condition is getting worse    Pain: intermittent  Current: 2/10     Best: 0/10     Worst:10/10    Symptom Type/Quality: sharp, shooting, piercing  Location[de-identified] Back: lumbar region does not radiate         Provoking Activities/Positions: walking, bending, stairs                 Relieving Activitie/Positions: sitting, lying on back, massage    Disturbed Sleep: no  Bladder Dysfunction: no  Bowel Dysfunction: no     Imaging results: No results found. Past Medical History:  Past Medical History:   Diagnosis Date    Diverticulosis     Gout     Hyperlipidemia     Hypertension     Thyroid disease      Past Surgical History:   Procedure Laterality Date    DUPUYTRENS CONTRACTURE SURGERY Left 2019    LEFT WRIST 4-BONE FUSION, LEFT WRIST S-BONE EXCISION, LEFT UPPER EXTREMITY CUBITAL TUNNEL RELEASE performed by Virgen Garcia MD at [facility] Left     wrist    KNEE SURGERY Right     MANIP OF FRACT.  NASAL BONE SEPTUM AND SPLINT STABILIZATN      REMOVE HARDWARE HAND Left 3/27/2019    LEFT WRIST  HARDWARE REMOVAL performed by Virgen Garcia MD at [address] [facility] Right []+ / [] -    Left [x]+ / [] -  [x] Slump           Right []+ / [x] -    Left [x]+ / [] -  [x] FADIR          Right []+ / [x] -    Left []+ / [x] -  [] S-I Distraction          Right []+ / [] -    Left []+ / [] -     [x] SLR           Right []+ / [x] -    Left []+ / [] -     [x] RHONDA          Right []+ / [x] -    Left []+ / [x] -  [] S-I Compression          Right []+ / [x] -    Left []+ / [x] -   [] Other: []+ / [] -       Special Test Comments: worse c grde 3/4 PA mobs central L5/S1    ASSESSMENT     Outcome Measure:   Modified Oswestry 30% disability    Problems:    Pain reported 10/10 c activity   ROM decreased    Decreased functional ability with walking, standing, work, ADLs, sports and bending    [x] There are no barriers affecting plan of care or recovery    [] Barriers to this patient's plan of care or recovery include.     Domestic Concerns:  [x] No  [] Yes:    Short Term goals (4 weeks)   Decrease reported pain to 5/10 c activity   Increase ROM to 75% globally   Strength incr to 4+/5 globally   Able to perform/complete the following functions/tasks: bending and reaching   Oswestry Low Back Disability Questionnaire 15% disability    Long Term goals (8 weeks)   Decrease reported pain to 0/10 at rest and with activity   Increase ROM to 100% pain free   Strength incr to 5/5 globally   Able to perform/complete the following functions/tasks: ADLs and work activities pain free    Oswestry Low Back Disability Questionnaire 0% disability    Independent with Home Exercise Programs    Rehab Potential: [x] Good  [] Fair  [] Poor    PLAN       Treatment Plan:   [x] Therapeutic Exercise  [x] Therapeutic Activity  [x] Neuromuscular Re-education   [x] Gait Training  [] Balance Training  [x] Aerobic conditioning  [x] Manual Therapy  [x] Massage/Fascial release   [] Work/Sport specific activities    [] Pain Neuroscience [x] Cold/hotpack  [] Vasocompression  [x] Electrical Stimulation  [x] Lumbar/Cervical Traction  [x] Ultrasound   [] Iontophoresis: 4 mg/mL Dexamethasone Sodium Phosphate 40-80 mAmin        [x] Instruction in HEP      []  Medication allergies reviewed for use of Dexamethasone Sodium Phosphate 4mg/ml  with iontophoresis treatments. Patient is not allergic. The following CPT codes are likely to be used in the care of this patient: 86542 PT Evaluation: Low Complexity       Suggested Professional Referral: [x] No  [] Yes:     Patient Education:  [x] Plans/Goals, Risks/Benefits discussed  [x] Home exercise program  Method of Education: [x] Verbal  [x] Demo  [x] Written  Comprehension of Education:  [x] Verbalizes understanding. [x] Demonstrates understanding. [] Needs Review. [] Demonstrates/verbalizes understanding of HEP/Ed previously given. Frequency:  2-3 days per week for 8 weeks    Patient understands diagnosis/prognosis and consents to treatment, plan and goals: [x] Yes    [] No     Thank you for the opportunity to work with your patient. If you have questions or comments, please contact me at [phone]; fax: [phone]. Electronically signed by: Atilio Dick PT         Please sign Physician's Certification and return to: [place] [id number]  Dept: [phone]  Dept Fax: [phone] Certification / Comments     Frequency/Duration 2-3 days per week for 8 weeks. Certification period from 5/29/2019  to 7/29/19. I have reviewed the Plan of Care established for skilled therapy services and certify that the services are required and that they will be provided while the patient is under my care.     Physician's Comments/Revisions:               Physician's Printed Name:                                           [de-identified] Signature:                                                               Date:

## 2019-06-05 ENCOUNTER — TREATMENT (OUTPATIENT)
Dept: OCCUPATIONAL THERAPY | Age: 54
End: 2019-06-05
Payer: COMMERCIAL

## 2019-06-05 ENCOUNTER — TREATMENT (OUTPATIENT)
Dept: PHYSICAL THERAPY | Age: 54
End: 2019-06-05
Payer: COMMERCIAL

## 2019-06-05 DIAGNOSIS — M54.50 MIDLINE LOW BACK PAIN WITHOUT SCIATICA, UNSPECIFIED CHRONICITY: Primary | ICD-10-CM

## 2019-06-05 DIAGNOSIS — S69.92XA INJURY OF INTERCARPAL LIGAMENT OF LEFT WRIST WITHOUT INSTABILITY, INITIAL ENCOUNTER: Primary | ICD-10-CM

## 2019-06-05 DIAGNOSIS — G56.22 CUBITAL TUNNEL SYNDROME, LEFT: ICD-10-CM

## 2019-06-05 PROCEDURE — G0283 ELEC STIM OTHER THAN WOUND: HCPCS

## 2019-06-05 PROCEDURE — 97140 MANUAL THERAPY 1/> REGIONS: CPT | Performed by: OCCUPATIONAL THERAPIST

## 2019-06-05 PROCEDURE — 97110 THERAPEUTIC EXERCISES: CPT

## 2019-06-05 PROCEDURE — 97022 WHIRLPOOL THERAPY: CPT | Performed by: OCCUPATIONAL THERAPIST

## 2019-06-05 PROCEDURE — 97110 THERAPEUTIC EXERCISES: CPT | Performed by: OCCUPATIONAL THERAPIST

## 2019-06-05 NOTE — PROGRESS NOTES
Physical Therapy Treatment Note    Date: 2019  Patient Name: Bruna Teixeira  : 1965   MRN: [id number]  DOInjury: 3/1/19  DOSx: N/A  Referring Provider:   Carlyle Martinez DO  [address] [facility] [address]  [place], [address] [facility] Diagnosis:     Diagnosis Orders   1. Midline low back pain without sciatica, unspecified chronicity           Outcome Measure:      S: Pt c/o pain and stiffness in low back. O:  Time 8:10-9:05     Visit  Repeat outcome measure at mid point and end. Pain 5/10     ROM      Modalities      MH + ES 20 min Prone MO         Exercise      ALL EXERCISE DONE WITH DRAW-IN TECHNIQUE                           Functional activities To aid in reaching , pushing, pulling tasks at home     ROWS: H  \"    ROWS: M  \"    ROWS: L  \"    Obliques - high  \"    Obliques - low  \"     THEREX     Nustep   L5 10 min  TE   Punches 2 x 20  TE   Lat pulldowns      Triceps ext standing      Marching      Pelvic Tilts 2 x 10  TE   Trunk ext TB 2 x 10  TE   Trunk flex TB 2 x 10  TE   Hip abd 2 x 10  TE   Hip EXT 2 x 10  TE   TG Squats Next  TE               A:  Tolerated well. Above added to written HEP.    P: Continue with rehab plan  Genevive Antes, PTA    Treatment Charges: Mins Units   Initial Evaluation     Re-Evaluation     Ther Exercise         TE 35 2   Manual Therapy     MT     Ther Activities        TA     Gait Training          GT     Neuro Re-education NR     Modalities 20 1   Non-Billable Service Time     Other     Total Time/Units 55 3

## 2019-06-05 NOTE — PROGRESS NOTES
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 Subjective: \" I think I still have a ways to go. . I need more therapy. \"     Objective:  Updated POC to be completed by visit 10. .    INTERVENTION: COMPLETED: SPECIFICS/COMMENTS:   Modality:     Fluidotherapy with AROM X 10 min. For soft tissue preconditioning prior to exercise   US X 10min. To CMC jt L thumb and wrist dorsally and ulnarly- 50% 3.3 Mhz, .8 intensity- helpful to decrease discomfort, edema x wrist   ice     AROM:     L wrist and thumb/ finger abd/adduction x All planes after heat and PROM stretch   Card turning   For forearm rotation stretch. Wrist er ciser 10 rep's  donald well    Mariana dowel ex/ green  Supination/ pronation to tolerance   AAROM:     UBE  3 min  forw and 3 back- donald well   4# ball  Elbow ext/flex - while flexed sup/pronate and then elbow ext/flex then wrist flex/ext. PROM/Stretching:     L wrist and thumb all planes x         Scar Mass/Edema Control:     Scar and retrograde x All scar areas - tender at elbow. Strengthening:     Lat pull down and tricep ext  25#/ 40# 1-20 each   Column pulley  15-# 1-15 high and 1 set low   Wrist PREs 1# 1-20  1# 1-10  flex/ ext   Medium soft blend theraputty x roll and 3 pt pinch x's 3 sets. Gentle gripping- putty provided for home use   Other:     HEP x See attached sheet.  AROM for  Wrist flex. Ext tenodesis, RD- UD , thumb rad. Abduc, and finger abduc/adduc. .           Assessment/Comments: Pt is making Good progress toward stated plan of care. However, overall tolerance for activity is decreased due to wrist soreness. R UE AROM: Exceptions to WNL  Wrist flexion 0-40 to 0-50  Wrist extension 0-40 to 0-55  Thumb ABD 0-34 to 0-45  Thumb extension 0-50 to 0-55  Thumb IP 0-30 to 0-40  Thumb flexion lacks 2 cm to base of small finger to -1 cm    R UE strength: Exceptions to WNL  Wrist 3-/5 to 3-/5  Forearm 3/5 to 4-/5  Elbow 3+/5 to 5/5   and pinches-  - 20# wth pain  Lateral pinch- 2# with pain     GOALS (Long term same as Short term):  1) Patient will demonstrate good understanding of home program(exercises/activities/diagnosis/prognosis/goals) with good accuracy. ( goal met - ROM, stretches, strengthening, putty ex)    2) Patient will demonstrate increased active/passive range of motion of their left wrist/ thumb to [facility] for ADL/IADL completion. (progress noted)    3) Patient will demonstrate increased elbow/ wrist strength to 4/5 with /pinch strength to [facility] for the left hand. (progress noted)    4) Patient to report decreased pain in their affected left distal upper extremity from 5/10 with light ROM  to 3/10 or less with resistive functional use. (progress noted)  Pain localized to the radial wrist/ base of the thumb. Pain averages  3-4/10. 5) Patient will be knowledgeable of edema control techniques as evident with decreases from moderate to mild. (goal met)  Edema is mild. 6) Patient will report ADL / IADL and leisure functions to [facility] with good effective use of the left arm.   Pt is able to tolerate light use of the left wrist/ hand for laundry and light lifting.       -Rehab Potential: Good  -Requires OT Follow Up: Yes  Time In:  0905          Time Out:  1000           Visit #: 10    Treatment Charges: Mins Units   Modalities/Fluido/ US 10/ 10 1   Ther Exercise 25 2   Manual Therapy 10 1   Thera Activities     ADL/Home Mgt      Neuro Re-education     Gait Training     Group Therapy     Non-Billable Service Time/ice     Other     Total Time/Units 55 4       -Response to Treatment: Pt has made good progress during his 10 completed OT session. Continued OT is    recommended to addess hand and wrist weakness. In addition, OT will focus on tolerance for more intense activity with the left wrist/ hand. OT needed at 2x/ week x 3 more weeks. Goals: Goals for pt can be seen on initial eval occurring on 4/18/2019    Plan:   [x]  Continue Plan of care: Treatment covered based on POC and graduated to patient's progress. Pt education continues at each visit to obtain maximum benefits from skilled OT intervention. []  [address] of care:   []  Discharge:      Maynor Gould OT/L, [id number]      I certify that the above evaluation and plan of care for Occupational Therapy services are appropriate and medically necessary. OT to continue at 2x/ week for 3 weeks.                                                                                    _______________________________________                                                                             Physician Signature

## 2019-06-12 ENCOUNTER — TREATMENT (OUTPATIENT)
Dept: PHYSICAL THERAPY | Age: 54
End: 2019-06-12
Payer: COMMERCIAL

## 2019-06-12 ENCOUNTER — TREATMENT (OUTPATIENT)
Dept: OCCUPATIONAL THERAPY | Age: 54
End: 2019-06-12
Payer: COMMERCIAL

## 2019-06-12 DIAGNOSIS — M54.50 MIDLINE LOW BACK PAIN WITHOUT SCIATICA, UNSPECIFIED CHRONICITY: Primary | ICD-10-CM

## 2019-06-12 DIAGNOSIS — S69.92XA INJURY OF INTERCARPAL LIGAMENT OF LEFT WRIST WITHOUT INSTABILITY, INITIAL ENCOUNTER: Primary | ICD-10-CM

## 2019-06-12 DIAGNOSIS — G56.22 CUBITAL TUNNEL SYNDROME, LEFT: ICD-10-CM

## 2019-06-12 PROCEDURE — 97110 THERAPEUTIC EXERCISES: CPT | Performed by: OCCUPATIONAL THERAPIST

## 2019-06-12 PROCEDURE — 97022 WHIRLPOOL THERAPY: CPT | Performed by: OCCUPATIONAL THERAPIST

## 2019-06-12 PROCEDURE — G0283 ELEC STIM OTHER THAN WOUND: HCPCS

## 2019-06-12 PROCEDURE — 97110 THERAPEUTIC EXERCISES: CPT

## 2019-06-12 NOTE — PROGRESS NOTES
Physical Therapy Treatment Note    Date: 2019  Patient Name: Apurva Chu  : 1965   MRN: [id number]  DOInjury: 3/1/19  DOSx: N/A  Referring Provider:   Surya Andrews DO  [address] [facility] [address]  [place], [address] [facility] Diagnosis:     Diagnosis Orders   1. Midline low back pain without sciatica, unspecified chronicity         Outcome Measure:      S: Pt reported that his sciatica isn't acting up as much but still has a lot of tightness. O:  Time 8:10-9:10     Visit 3/18 Repeat outcome measure at mid point and end. Pain 5/10     ROM      Modalities      MH + ES - Premod 10 min  D/t limited time Prone MO         Exercise      ALL EXERCISE DONE WITH DRAW-IN TECHNIQUE      Hamstring stretch 3 x 30 sec hold  MT   SKTC 3 x 20-20 sec hold  TE   DKTC   TE    Functional activities To aid in reaching , pushing, pulling tasks at home     ROWS: H  \"    ROWS: M  \"    ROWS: L  \"    Obliques - high  \"    Obliques - low  \"     THEREX     Nustep   L5 10 min  TE   Punches 2 x 20  TE   Lat pulldowns      Triceps ext standing      Marching      Pelvic Tilts 2 x 10  TE   Trunk ext TB   TE   Trunk flex TB  TE   Hip abd 2 x 10 TE   Hip EXT 2 x 10  TE   Squats 2 x 10  TE   TG Squats 2 x 20  TE               A:  Tolerated well. Above added to written HEP.    P: Continue with rehab plan  Rhina Rucker PTA    Treatment Charges: Mins Units   Initial Evaluation     Re-Evaluation     Ther Exercise         TE 30 2   Manual Therapy     MT     Ther Activities        TA     Gait Training          GT     Neuro Re-education NR     Modalities 10 1   Non-Billable Service Time     Other     Total Time/Units 40 3

## 2019-06-12 NOTE — PROGRESS NOTES
Physical Therapy Treatment Note    Date: 2019  Patient Name: Halie Tejeda  : 1965   MRN: [id number]  DOInjury: 3/1/19  DOSx: N/A  Referring Provider:   Yajaira Prajapati DO  [address] [facility] [address]  [place], [address] [facility] Diagnosis:     Diagnosis Orders   1. Midline low back pain without sciatica, unspecified chronicity           Outcome Measure:      S: Pt reported that his sciatica isn't acting up as much. O:  Time 8:10-     Visit 3/18 Repeat outcome measure at mid point and end. Pain 5/10     ROM      Modalities      MH + ES - Premod 10 min  D/t limited time  Prone MO         Exercise      ALL EXERCISE DONE WITH DRAW-IN TECHNIQUE      Hamstring stretch   TE   SKTC   TE   DKTC   TE    Functional activities To aid in reaching , pushing, pulling tasks at home     ROWS: H  \"    ROWS: M  \"    ROWS: L  \"    Obliques - high  \"    Obliques - low  \"     THEREX     Nustep   L5 10 min  TE   Punches 2 x 20  TE   Lat pulldowns      Triceps ext standing      Marching      Pelvic Tilts 2 x 10  TE   Trunk ext TB   TE   Trunk flex TB  TE   Hip abd 2 x 10 TE   Hip EXT 2 x 10  TE   Squats 2 x 10     TG Squats 2 x 20  TE               A:  Tolerated well. Above added to written HEP.    P: Continue with rehab plan  [address] [place] [address], [facility]    Treatment Charges: Mins Units   Initial Evaluation     Re-Evaluation     Ther Exercise         TE 35 2   Manual Therapy     MT     Ther Activities        TA     Gait Training          GT     Neuro Re-education NR     Modalities 20 1   Non-Billable Service Time     Other     Total Time/Units 55 3

## 2019-06-12 NOTE — PROGRESS NOTES
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 Subjective: \" I think I still have a ways to go. . I need more therapy. \"     Objective:  Updated POC to be completed by visit 10. .    INTERVENTION: COMPLETED: SPECIFICS/COMMENTS:   Modality:     Fluidotherapy with AROM X 10 min. For soft tissue preconditioning prior to exercise   US X 10min. To CMC jt L thumb and wrist dorsally and ulnarly- 50% 3.3 Mhz, .8 intensity- helpful to decrease discomfort, edema x wrist   ice     AROM:     L wrist and hand AROM/ all planes x    Wrist er ciser  donald well    AAROM:     UBE  3 min  forw and 3 back- donald well   4# ball  Elbow ext/flex - while flexed sup/pronate and then elbow ext/flex then wrist flex/ext. PROM/Stretching:     L wrist and thumb all planes x         Scar Mass/Edema Control:     Scar and retrograde x All scar areas - tender at elbow. Strengthening:          [place] bar/ green x Twist, forearm rotation   Weighted stick x 1# 1-15 forearm rotation and deviations   Wrist PREs 1# 1-20  1# 1-10  flex/ ext   Digiflex     Medium soft blend theraputty x roll and 3 pt pinch x's 3 sets,  gripping- putty provided again for home use   Other:     HEP x See attached sheet. AROM for  Wrist flex. Ext tenodesis, RD- UD , thumb rad. Abduc, and finger abduc/adduc. Yarely Duggan Assessment/Comments: Pt is making Good progress toward stated plan of care. However, overall tolerance for activity is decreased due to wrist soreness.     -Rehab Potential: Good  -Requires OT Follow Up: Yes  Time In:  0915          Time Out:  1000           Visit #: 10    Treatment Charges: Mins Units   Modalities/Fluido/ US 10 1   Ther Exercise 30 2   Manual Therapy 5    Thera Activities     ADL/Home Mgt      Neuro Re-education     Gait Training     Group Therapy     Non-Billable Service Time/ice     Other     Total Time/Units 45 3       -Response to Treatment: Progress continues. Goals: Goals for pt can be seen on initial eval occurring on 4/18/2019, re-eval 6-5-19  Plan:   [x]  Continue Plan of care: Treatment covered based on POC and graduated to patient's progress. Pt education continues at each visit to obtain maximum benefits from skilled OT intervention.   []  [address] of care:   []  Discharge:      Tommy Rojo OT/L, [id number]

## 2019-06-14 ENCOUNTER — TREATMENT (OUTPATIENT)
Dept: OCCUPATIONAL THERAPY | Age: 54
End: 2019-06-14
Payer: COMMERCIAL

## 2019-06-14 ENCOUNTER — TREATMENT (OUTPATIENT)
Dept: PHYSICAL THERAPY | Age: 54
End: 2019-06-14
Payer: COMMERCIAL

## 2019-06-14 DIAGNOSIS — G56.22 CUBITAL TUNNEL SYNDROME, LEFT: ICD-10-CM

## 2019-06-14 DIAGNOSIS — S69.92XA INJURY OF INTERCARPAL LIGAMENT OF LEFT WRIST WITHOUT INSTABILITY, INITIAL ENCOUNTER: Primary | ICD-10-CM

## 2019-06-14 DIAGNOSIS — M54.50 MIDLINE LOW BACK PAIN WITHOUT SCIATICA, UNSPECIFIED CHRONICITY: Primary | ICD-10-CM

## 2019-06-14 PROCEDURE — 97530 THERAPEUTIC ACTIVITIES: CPT | Performed by: OCCUPATIONAL THERAPIST

## 2019-06-14 PROCEDURE — G0283 ELEC STIM OTHER THAN WOUND: HCPCS | Performed by: PHYSICAL THERAPIST

## 2019-06-14 PROCEDURE — 97110 THERAPEUTIC EXERCISES: CPT | Performed by: PHYSICAL THERAPIST

## 2019-06-14 PROCEDURE — 97110 THERAPEUTIC EXERCISES: CPT | Performed by: OCCUPATIONAL THERAPIST

## 2019-06-14 PROCEDURE — 97022 WHIRLPOOL THERAPY: CPT | Performed by: OCCUPATIONAL THERAPIST

## 2019-06-14 PROCEDURE — 97140 MANUAL THERAPY 1/> REGIONS: CPT | Performed by: OCCUPATIONAL THERAPIST

## 2019-06-14 NOTE — PROGRESS NOTES
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 Subjective: Pt presents with no new complaints. Objective:  Updated POC to be completed by visit 10. .    INTERVENTION: COMPLETED: SPECIFICS/COMMENTS:   Modality:     Fluidotherapy with AROM X 10 min. For soft tissue preconditioning prior to exercise   US   To CMC jt L thumb and wrist dorsally and ulnarly- 50% 3.3 Mhz, .8 intensity- helpful to decrease discomfort, edema x wrist   ice     AROM:     L wrist and hand AROM/ all planes x    Wrist er ciser  donald well    AAROM:     UBE  3 min  forw and 3 back- donald well   4# ball  Elbow ext/flex - while flexed sup/pronate and then elbow ext/flex then wrist flex/ext. PROM/Stretching:     L wrist and thumb all planes x         Scar Mass/Edema Control:     Scar and retrograde x All scar areas - tender at elbow. Strengthening:     Putty tools- soft x Hook, large lid, key   [place] bar/ green 10x Twist, forearm rotation   Weighted stick x 1# 1-20 forearm rotation and deviations   Wrist PREs 2# 2-10  flex/ ext   Digiflex     Medium soft blend theraputty x roll and 3 pt pinch x's 3 sets,  gripping- putty provided again for home use   Other:     HEP x See attached sheet. AROM for  Wrist flex. Ext tenodesis, RD- UD , thumb rad. Abduc, and finger abduc/adduc. Linnea Randolph Assessment/Comments: Pt is making Good progress toward stated plan of care. However, overall tolerance for activity is decreased due to wrist soreness.     -Rehab Potential: Good  -Requires OT Follow Up: Yes  Time In:  0905          Time Out:  1000           Visit #: 2 (10 prior)    Treatment Charges: Mins Units   Modalities/Fluido 10 1   Ther Exercise 20 1   Manual Therapy 10 1   Thera Activities 15 1   ADL/Home Mgt      Neuro Re-education     Gait Training     Group Therapy     Non-Billable Service Time/ice     Other     Total Time/Units 55 4       -Response to Treatment: Progress continues. Goals: Goals for pt can be seen on initial eval occurring on 4/18/2019, re-eval 6-5-19  Plan:   [x]  Continue Plan of care: Treatment covered based on POC and graduated to patient's progress. Pt education continues at each visit to obtain maximum benefits from skilled OT intervention.   []  [address] of care:   []  Discharge:      Raz Larsen OT/L, [id number]

## 2019-06-14 NOTE — PROGRESS NOTES
Physical Therapy Treatment Note    Date: 2019  Patient Name: Sydney Leggett  : 1965   MRN: [id number]  DOInjury: 3/1/19  DOSx: N/A  Referring Provider:   Rose Frausto DO  [address]  [place], [address] [facility] Diagnosis:     Diagnosis Orders   1. Midline low back pain without sciatica, unspecified chronicity         Outcome Measure:      S: Pt reported he is feeling better. O:  Time 8:20-9:00     Visit  Repeat outcome measure at mid point and end. Pain 2-3/10     ROM      Modalities      MH + ES - IFC 15 min  D/t limited time Prone MO         Exercise      ALL EXERCISE DONE WITH DRAW-IN TECHNIQUE      Hamstring stretch 3 x 30 sec hold  MT   SKTC 3 x 30 sec hold  TE   DKTC   TE    Functional activities To aid in reaching , pushing, pulling tasks at home     ROWS: H  \"    ROWS: M  \"    ROWS: L  \"    Obliques - high  \"    Obliques - low  \"     THEREX     Nustep   L5 5 min  d/t limited time  TE   Punches   TE   Lat pulldowns      Triceps ext standing      Marching      Pelvic Tilts 2 x 10  TE   Trunk ext TB   TE   Trunk flex TB  TE   Hip abd 25x TE   Hip EXT 25x   TE   Squats 25x  TE   TG Squats 2 x 20  TE               A:  Tolerated well. Above added to written HEP. Pt arrived late to treatment where he was unable to perform the above exercises.    P: Continue with rehab plan  Pao Ku PTA    Treatment Charges: Mins Units   Initial Evaluation     Re-Evaluation     Ther Exercise         TE 25 2   Manual Therapy     MT     Ther Activities        TA     Gait Training          GT     Neuro Re-education NR     Modalities 15 1   Non-Billable Service Time     Other     Total Time/Units 40 3

## 2019-06-18 ENCOUNTER — TREATMENT (OUTPATIENT)
Dept: OCCUPATIONAL THERAPY | Age: 54
End: 2019-06-18
Payer: COMMERCIAL

## 2019-06-18 ENCOUNTER — TREATMENT (OUTPATIENT)
Dept: PHYSICAL THERAPY | Age: 54
End: 2019-06-18
Payer: COMMERCIAL

## 2019-06-18 DIAGNOSIS — M54.50 MIDLINE LOW BACK PAIN WITHOUT SCIATICA, UNSPECIFIED CHRONICITY: Primary | ICD-10-CM

## 2019-06-18 DIAGNOSIS — S69.92XA INJURY OF INTERCARPAL LIGAMENT OF LEFT WRIST WITHOUT INSTABILITY, INITIAL ENCOUNTER: Primary | ICD-10-CM

## 2019-06-18 DIAGNOSIS — G56.22 CUBITAL TUNNEL SYNDROME, LEFT: ICD-10-CM

## 2019-06-18 PROCEDURE — 97530 THERAPEUTIC ACTIVITIES: CPT | Performed by: OCCUPATIONAL THERAPIST

## 2019-06-18 PROCEDURE — 97022 WHIRLPOOL THERAPY: CPT | Performed by: OCCUPATIONAL THERAPIST

## 2019-06-18 PROCEDURE — G0283 ELEC STIM OTHER THAN WOUND: HCPCS

## 2019-06-18 PROCEDURE — 97110 THERAPEUTIC EXERCISES: CPT

## 2019-06-18 PROCEDURE — 97110 THERAPEUTIC EXERCISES: CPT | Performed by: OCCUPATIONAL THERAPIST

## 2019-06-18 NOTE — PROGRESS NOTES
Physical Therapy Treatment Note    Date: 2019  Patient Name: Halie Tejeda  : 1965   MRN: [id number]  DOInjury: 3/1/19  DOSx: N/A  Referring Provider:   Yajaira Prajapati DO  [address] [facility] [address]  [place], [address] [facility] Diagnosis:     Diagnosis Orders   1. Midline low back pain without sciatica, unspecified chronicity         Outcome Measure:      S: Pt reported he is con't to feel better with only slight stiffness. O:  Time 10:00-10:55     Visit  Repeat outcome measure at mid point and end. Pain 2-3/10     ROM      Modalities      MH + ES - IFC 20 min Prone MO         Exercise      ALL EXERCISE DONE WITH DRAW-IN TECHNIQUE      Hamstring stretch 3 x 30 sec hold  MT   SKTC 3 x 30 sec hold  TE   DKTC   TE    Functional activities To aid in reaching , pushing, pulling tasks at home     ROWS: H  \"    ROWS: M  \"    ROWS: L  \"    Obliques - high  \"    Obliques - low  \"     THEREX     Nustep   L5 8 min   TE   Treadmill Next instead of Nustep  TE   Punches   TE   Pelvic Tilts 2 x 10     SLR 10x New TE   Bridging  2 x 10New TE   Hip abd 25x TE   Hip EXT 25x   TE   Squats 25x  TE   TG Squats 3 x 20  TE               A:  Tolerated well. Above added to written HEP.    P: Continue with rehab plan  Perez Wilson PTA    Treatment Charges: Mins Units   Initial Evaluation     Re-Evaluation     Ther Exercise         TE 45 3   Manual Therapy     MT     Ther Activities        TA     Gait Training          GT     Neuro Re-education NR     Modalities 15 1   Non-Billable Service Time     Other     Total Time/Units 60 4

## 2019-06-18 NOTE — PROGRESS NOTES
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 Objective:  Updated POC to be completed by visit 10. .    INTERVENTION: COMPLETED: SPECIFICS/COMMENTS:   Modality:     Fluidotherapy with AROM X 10 min. For soft tissue preconditioning prior to exercise   US   To CMC jt L thumb and wrist dorsally and ulnarly- 50% 3.3 Mhz, .8 intensity- helpful to decrease discomfort, edema x wrist   ice     AROM:     L wrist and hand AROM/ all planes x    Wrist er ciser x Tight bt loosened with reps   AAROM:     UBE  3 min  forw and 3 back- donald well   4# ball  Elbow ext/flex - while flexed sup/pronate and then elbow ext/flex then wrist flex/ext. PROM/Stretching:     L wrist and thumb all planes x         Scar Mass/Edema Control:     Scar and retrograde x All scar areas - tender at elbow. Strengthening:     Putty tools- soft x Hook, large lid, key   [place] bar/ green 10x Twist, forearm rotation   Weighted stick x 1# 1-20 forearm rotation and deviations 1# 1-10   Wrist PREs 2# 2-10  flex/ ext   Hand gripper/ static 25# 5x- 10 count    Medium soft blend theraputty x roll and 3 pt pinch x's 3 sets,  gripping- putty provided again for home use   Other:     HEP x See attached sheet. AROM for  Wrist flex. Ext tenodesis, RD- UD , thumb rad. Abduc, and finger abduc/adduc. .           Assessment/Comments: Pt is making Good progress toward stated plan of care. -Rehab Potential: Good  -Requires OT Follow Up: Yes  Time In:  0805          Time Out:  0845          Visit #: 3 (10 prior)    Treatment Charges: Mins Units   Modalities/Fluido 10 1   Ther Exercise 15 1   Manual Therapy 5    Thera Activities 10 1   ADL/Home Mgt      Neuro Re-education     Gait Training     Group Therapy     Non-Billable Service Time/ice     Other     Total Time/Units 40 3       -Response to Treatment: Progress relating to the wrist continues. Pt states his elbow is much better with a small residual numbness in the ring/ small fingers. Mild tenderness is also reported with WB onto the elbow. Goals: Goals for pt can be seen on initial eval occurring on 4/18/2019, re-eval 6-5-19  Plan:   [x]  Continue Plan of care: Treatment covered based on POC and graduated to patient's progress. Pt education continues at each visit to obtain maximum benefits from skilled OT intervention.   []  [address] [facility] of care:   []  Discharge:      Fuentes Gar OT/L, [id number]

## 2019-06-27 ENCOUNTER — TREATMENT (OUTPATIENT)
Dept: PHYSICAL THERAPY | Age: 54
End: 2019-06-27
Payer: COMMERCIAL

## 2019-06-27 ENCOUNTER — TREATMENT (OUTPATIENT)
Dept: OCCUPATIONAL THERAPY | Age: 54
End: 2019-06-27
Payer: COMMERCIAL

## 2019-06-27 DIAGNOSIS — G56.22 CUBITAL TUNNEL SYNDROME, LEFT: ICD-10-CM

## 2019-06-27 DIAGNOSIS — S69.92XA INJURY OF INTERCARPAL LIGAMENT OF LEFT WRIST WITHOUT INSTABILITY, INITIAL ENCOUNTER: Primary | ICD-10-CM

## 2019-06-27 DIAGNOSIS — M54.50 MIDLINE LOW BACK PAIN WITHOUT SCIATICA, UNSPECIFIED CHRONICITY: Primary | ICD-10-CM

## 2019-06-27 PROCEDURE — 97110 THERAPEUTIC EXERCISES: CPT | Performed by: OCCUPATIONAL THERAPIST

## 2019-06-27 PROCEDURE — 97530 THERAPEUTIC ACTIVITIES: CPT | Performed by: OCCUPATIONAL THERAPIST

## 2019-06-27 PROCEDURE — 97022 WHIRLPOOL THERAPY: CPT | Performed by: OCCUPATIONAL THERAPIST

## 2019-06-27 PROCEDURE — 97110 THERAPEUTIC EXERCISES: CPT | Performed by: PHYSICAL THERAPIST

## 2019-06-27 PROCEDURE — G0283 ELEC STIM OTHER THAN WOUND: HCPCS | Performed by: PHYSICAL THERAPIST

## 2019-06-27 NOTE — PROGRESS NOTES
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 Objective:  Updated POC to be completed by visit 10. .    INTERVENTION: COMPLETED: SPECIFICS/COMMENTS:   Modality:     Fluidotherapy with AROM X 10 min. For soft tissue preconditioning prior to exercise   US   To CMC jt L thumb and wrist dorsally and ulnarly- 50% 3.3 Mhz, .8 intensity- helpful to decrease discomfort, edema x wrist   ice     AROM:     L wrist and hand AROM/ all planes x    Wrist er ciser x    AAROM:     UBE  3 min  forw and 3 back- donald well   4# ball  Elbow ext/flex - while flexed sup/pronate and then elbow ext/flex then wrist flex/ext. PROM/Stretching:     L wrist and thumb all planes x         Scar Mass/Edema Control:     Scar and retrograde x All scar areas - tender at elbow. Strengthening:     Putty tools- soft  Hook, large lid, key   [place] bar/ green 10x Twist, forearm rotation   Weighted stick  1# 1-20 forearm rotation and deviations 1# 1-10   Wrist PREs 2# 2-10  flex/ ext   Hand gripper/ static 25# 5x- 10 count    Medium soft blend theraputty x roll and 3 pt pinch x's 3 sets,  gripping- putty provided again for home use   Other:     HEP x See attached sheet. AROM for  Wrist flex. Ext tenodesis, RD- UD , thumb rad.  Abduc, and finger abduc/adduc. .           Assessment/Comments: Pt is making Good progress toward stated plan of care. MD approval on continued therapy received. -Rehab Potential: Good  -Requires OT Follow Up: Yes  Time In:  0905         Time Out:  1000         Visit #: 4 (10 prior)    Treatment Charges: Mins Units   Modalities/Fluido 10 1   Ther Exercise 10 1   Manual Therapy 5    Thera Activities 30 1   ADL/Home Mgt      Neuro Re-education     Gait Training     Group Therapy     Non-Billable Service Time/ice     Other     Total Time/Units 55 4       -Response to Treatment: Progress continues. Orders received for continued therapy. Goals: Goals for pt can be seen on initial eval occurring on 4/18/2019, re-eval 6-5-19  Plan:   [x]  Continue Plan of care: Treatment covered based on POC and graduated to patient's progress. Pt education continues at each visit to obtain maximum benefits from skilled OT intervention.   []  [address] [facility] of care:   []  Discharge:      Mike Thompson OT/L, [id number]

## 2019-07-01 ENCOUNTER — TREATMENT (OUTPATIENT)
Dept: OCCUPATIONAL THERAPY | Age: 54
End: 2019-07-01
Payer: COMMERCIAL

## 2019-07-01 ENCOUNTER — TREATMENT (OUTPATIENT)
Dept: PHYSICAL THERAPY | Age: 54
End: 2019-07-01
Payer: COMMERCIAL

## 2019-07-01 DIAGNOSIS — M54.50 MIDLINE LOW BACK PAIN WITHOUT SCIATICA, UNSPECIFIED CHRONICITY: Primary | ICD-10-CM

## 2019-07-01 DIAGNOSIS — G56.22 CUBITAL TUNNEL SYNDROME, LEFT: ICD-10-CM

## 2019-07-01 DIAGNOSIS — S69.92XA INJURY OF INTERCARPAL LIGAMENT OF LEFT WRIST WITHOUT INSTABILITY, INITIAL ENCOUNTER: Primary | ICD-10-CM

## 2019-07-01 PROCEDURE — 97110 THERAPEUTIC EXERCISES: CPT

## 2019-07-01 PROCEDURE — 97530 THERAPEUTIC ACTIVITIES: CPT | Performed by: OCCUPATIONAL THERAPIST

## 2019-07-01 PROCEDURE — 97110 THERAPEUTIC EXERCISES: CPT | Performed by: OCCUPATIONAL THERAPIST

## 2019-07-01 PROCEDURE — G0283 ELEC STIM OTHER THAN WOUND: HCPCS

## 2019-07-01 PROCEDURE — 97022 WHIRLPOOL THERAPY: CPT | Performed by: OCCUPATIONAL THERAPIST

## 2019-07-08 ENCOUNTER — TREATMENT (OUTPATIENT)
Dept: OCCUPATIONAL THERAPY | Age: 54
End: 2019-07-08
Payer: COMMERCIAL

## 2019-07-08 ENCOUNTER — TREATMENT (OUTPATIENT)
Dept: PHYSICAL THERAPY | Age: 54
End: 2019-07-08
Payer: COMMERCIAL

## 2019-07-08 DIAGNOSIS — M54.50 MIDLINE LOW BACK PAIN WITHOUT SCIATICA, UNSPECIFIED CHRONICITY: Primary | ICD-10-CM

## 2019-07-08 DIAGNOSIS — S69.92XA INJURY OF INTERCARPAL LIGAMENT OF LEFT WRIST WITHOUT INSTABILITY, INITIAL ENCOUNTER: Primary | ICD-10-CM

## 2019-07-08 DIAGNOSIS — G56.22 CUBITAL TUNNEL SYNDROME, LEFT: ICD-10-CM

## 2019-07-08 PROCEDURE — 97110 THERAPEUTIC EXERCISES: CPT

## 2019-07-08 PROCEDURE — G0283 ELEC STIM OTHER THAN WOUND: HCPCS

## 2019-07-08 PROCEDURE — 97110 THERAPEUTIC EXERCISES: CPT | Performed by: OCCUPATIONAL THERAPIST

## 2019-07-08 PROCEDURE — 97022 WHIRLPOOL THERAPY: CPT | Performed by: OCCUPATIONAL THERAPIST

## 2019-07-08 PROCEDURE — 97530 THERAPEUTIC ACTIVITIES: CPT | Performed by: OCCUPATIONAL THERAPIST

## 2019-07-12 ENCOUNTER — TREATMENT (OUTPATIENT)
Dept: PHYSICAL THERAPY | Age: 54
End: 2019-07-12
Payer: COMMERCIAL

## 2019-07-12 ENCOUNTER — TREATMENT (OUTPATIENT)
Dept: OCCUPATIONAL THERAPY | Age: 54
End: 2019-07-12
Payer: COMMERCIAL

## 2019-07-12 DIAGNOSIS — G56.22 CUBITAL TUNNEL SYNDROME, LEFT: ICD-10-CM

## 2019-07-12 DIAGNOSIS — M54.50 MIDLINE LOW BACK PAIN WITHOUT SCIATICA, UNSPECIFIED CHRONICITY: Primary | ICD-10-CM

## 2019-07-12 DIAGNOSIS — S69.92XA INJURY OF INTERCARPAL LIGAMENT OF LEFT WRIST WITHOUT INSTABILITY, INITIAL ENCOUNTER: Primary | ICD-10-CM

## 2019-07-12 PROCEDURE — 97110 THERAPEUTIC EXERCISES: CPT | Performed by: OCCUPATIONAL THERAPIST

## 2019-07-12 PROCEDURE — 97530 THERAPEUTIC ACTIVITIES: CPT | Performed by: OCCUPATIONAL THERAPIST

## 2019-07-12 PROCEDURE — G0283 ELEC STIM OTHER THAN WOUND: HCPCS

## 2019-07-12 PROCEDURE — 97110 THERAPEUTIC EXERCISES: CPT

## 2019-07-12 PROCEDURE — 97140 MANUAL THERAPY 1/> REGIONS: CPT | Performed by: OCCUPATIONAL THERAPIST

## 2019-07-12 NOTE — PROGRESS NOTES
flex/ ext,  RD- UD and forearm rotation. Did 2 sets with 3#   Hand gripper - red  X -  2 sets  25   Medium soft blend theraputty x roll and 3 pt pinch x's 3 sets,  gripping- putty provided again for home use,  instrinsic plus position, thumb adduction , finger and thumb abduction   Other:     HEP x See attached sheet. AROM for  Wrist flex. Ext tenodesis, RD- UD , thumb rad. Abduc, and finger abduc/adduc. .           Assessment/Comments: Pt is making Good progress toward stated plan of care.  ^ in thumb pain end of session do did US to decrease pain and ^ circulation.   -Rehab Potential: Good  -Requires OT Follow Up: Yes  Time In:  0805 am         Time Out:  900 am         Visit #: 7 (10 prior)    Treatment Charges: Mins Units   Modalities/Fluido- US 0/ 7 1   Ther Exercise 15 1   Manual Therapy     Thera Activities 35 2   ADL/Home Mgt      Neuro Re-education     Gait Training     Group Therapy     Non-Billable Service Time/ice     Other     Total Time/Units 55 4       -Response to Treatment: Progress continues. 1  more OT sessions - reviewing HEP so he will be Independent with it by D/C. Goals: Goals for pt can be seen on initial eval occurring on 4/18/2019, re-eval 6-5-19  Plan:   [x]  Continue Plan of care: Treatment covered based on POC and graduated to patient's progress. Pt education continues at each visit to obtain maximum benefits from skilled OT intervention.   []  Alter Plan of care:   []  Discharge:      Clara Client OT/L, CHT

## 2019-07-12 NOTE — PROGRESS NOTES
Physical Therapy Treatment Note    Date: 2019  Patient Name: Bandar Todd  : 1965   MRN: [id number]  DOInjury: 3/1/19  DOSx: N/A  Referring Provider:   Nas Patten DO  [address]  [facility], [address] [facility] Diagnosis:     Diagnosis Orders   1. Midline low back pain without sciatica, unspecified chronicity         Outcome Measure:      S: Pt reports soreness today . O:  Time 900-1000     Visit  Repeat outcome measure at mid point and end. Pain 2-3/10     ROM      Modalities      MH + ES - IFC 20 min    Prone MO         Exercise      ALL EXERCISE DONE WITH DRAW-IN TECHNIQUE      Hamstring stretch 3 x 30 sec hold  MT   SKTC 3 x 30 sec hold  TE   DKTC   TE    Functional activities To aid in reaching , pushing, pulling tasks at home     ROWS: H blk 2 x 20 \"    ROWS: M blk 2 x 20 \"    ROWS: L blk 2 x 20 \"    Obliques - high  \"    Obliques - low  \"     THEREX     Nustep/bike    TE   Treadmill 6 min Speed to comfort TE   Punches   TE   Pelvic Tilts 2 x 10     SLR 10x  TE   Bridging  2 x 10 TE   Hip abd 25x TE   Hip EXT 25x   TE   Squats 25x  TE   TG Squats L18/ 4x 20  TE               A:  Tolerated well. Above added to written HEP.  Gave pt blue tubing for HEP  P: Continue with rehab plan  Justo Amanda PTA    Treatment Charges: Mins Units   Initial Evaluation     Re-Evaluation     Ther Exercise         TE 45 3   Manual Therapy     MT     Ther Activities        TA     Gait Training          GT     Neuro Re-education NR     Modalities ES x 15 1   Non-Billable Service Time     Other     Total Time/Units 60 4

## 2019-07-15 ENCOUNTER — TELEPHONE (OUTPATIENT)
Dept: PHYSICAL THERAPY | Age: 54
End: 2019-07-15

## 2019-07-17 ENCOUNTER — TREATMENT (OUTPATIENT)
Dept: PHYSICAL THERAPY | Age: 54
End: 2019-07-17
Payer: COMMERCIAL

## 2019-07-17 DIAGNOSIS — M54.50 MIDLINE LOW BACK PAIN WITHOUT SCIATICA, UNSPECIFIED CHRONICITY: Primary | ICD-10-CM

## 2019-07-17 PROCEDURE — G0283 ELEC STIM OTHER THAN WOUND: HCPCS | Performed by: PHYSICAL THERAPIST

## 2019-07-17 PROCEDURE — 97110 THERAPEUTIC EXERCISES: CPT | Performed by: PHYSICAL THERAPIST

## 2019-07-19 ENCOUNTER — TREATMENT (OUTPATIENT)
Dept: OCCUPATIONAL THERAPY | Age: 54
End: 2019-07-19
Payer: COMMERCIAL

## 2019-07-19 DIAGNOSIS — S69.92XA INJURY OF INTERCARPAL LIGAMENT OF LEFT WRIST WITHOUT INSTABILITY, INITIAL ENCOUNTER: Primary | ICD-10-CM

## 2019-07-19 DIAGNOSIS — G56.22 CUBITAL TUNNEL SYNDROME, LEFT: ICD-10-CM

## 2019-07-19 PROCEDURE — 97530 THERAPEUTIC ACTIVITIES: CPT | Performed by: OCCUPATIONAL THERAPIST

## 2019-07-19 PROCEDURE — 97018 PARAFFIN BATH THERAPY: CPT | Performed by: OCCUPATIONAL THERAPIST

## 2019-07-19 PROCEDURE — 97110 THERAPEUTIC EXERCISES: CPT | Performed by: OCCUPATIONAL THERAPIST

## 2019-07-19 NOTE — PROGRESS NOTES
able to perform self care and light daily tasks. However, discomfort increases with tasks that involve moderate to heavy gripping, and/ or pinching.    -Rehab Potential: Good  -Requires OT Follow Up: Yes  Time In:  1405         Time Out: 1505        Visit #: 8 (10 prior)    Treatment Charges: Mins Units   Modalities/[place]- US 10/ 5 1   Ther Exercise 15 1   Manual Therapy     Thera Activities 30 2   ADL/Home Mgt      Neuro Re-education     Gait Training     Group Therapy     Non-Billable Service Time/ice     Other     Total Time/Units 55 4     Plan:   []  Continue Plan of care: Treatment covered based on POC and graduated to patient's progress. Pt education continues at each visit to obtain maximum benefits from skilled OT intervention. []  Alter Plan of care:   [x]  Discharge: Due to plateau in progress. Pt encouraged to continue his HEP with attention to hand strength.          Carmen Norwood OT/L, [id number]

## 2020-04-01 ENCOUNTER — APPOINTMENT (OUTPATIENT)
Dept: CT IMAGING | Age: 55
End: 2020-04-01

## 2020-04-01 ENCOUNTER — HOSPITAL ENCOUNTER (EMERGENCY)
Age: 55
Discharge: HOME | End: 2020-04-01

## 2020-04-01 VITALS
RESPIRATION RATE: 18 BRPM | HEIGHT: 70 IN | BODY MASS INDEX: 36.51 KG/M2 | HEART RATE: 64 BPM | WEIGHT: 255 LBS | OXYGEN SATURATION: 94 % | DIASTOLIC BLOOD PRESSURE: 102 MMHG | TEMPERATURE: 98 F | SYSTOLIC BLOOD PRESSURE: 155 MMHG

## 2020-04-01 PROCEDURE — 72131 CT LUMBAR SPINE W/O DYE: CPT

## 2020-04-01 PROCEDURE — 96372 THER/PROPH/DIAG INJ SC/IM: CPT

## 2020-04-01 PROCEDURE — 99283 EMERGENCY DEPT VISIT LOW MDM: CPT

## 2020-04-01 PROCEDURE — 6360000002 HC RX W HCPCS: Performed by: NURSE PRACTITIONER

## 2020-04-01 PROCEDURE — 6370000000 HC RX 637 (ALT 250 FOR IP): Performed by: NURSE PRACTITIONER

## 2020-04-01 RX ORDER — PREDNISONE 10 MG/1
TABLET ORAL
Qty: 20 TABLET | Refills: 0 | Status: SHIPPED | OUTPATIENT
Start: 2020-04-01 | End: 2020-04-11

## 2020-04-01 RX ORDER — NAPROXEN 500 MG/1
500 TABLET ORAL 2 TIMES DAILY PRN
Qty: 28 TABLET | Refills: 0 | Status: SHIPPED | OUTPATIENT
Start: 2020-04-01

## 2020-04-01 RX ORDER — PREDNISONE 20 MG/1
60 TABLET ORAL ONCE
Status: COMPLETED | OUTPATIENT
Start: 2020-04-01 | End: 2020-04-01

## 2020-04-01 RX ORDER — HYDROCODONE BITARTRATE AND ACETAMINOPHEN 5; 325 MG/1; MG/1
1 TABLET ORAL EVERY 6 HOURS PRN
Qty: 8 TABLET | Refills: 0 | Status: SHIPPED | OUTPATIENT
Start: 2020-04-01 | End: 2020-04-04

## 2020-04-01 RX ORDER — ORPHENADRINE CITRATE 30 MG/ML
60 INJECTION INTRAMUSCULAR; INTRAVENOUS ONCE
Status: COMPLETED | OUTPATIENT
Start: 2020-04-01 | End: 2020-04-01

## 2020-04-01 RX ORDER — ORPHENADRINE CITRATE 100 MG/1
100 TABLET, EXTENDED RELEASE ORAL 2 TIMES DAILY
Qty: 20 TABLET | Refills: 0 | Status: SHIPPED | OUTPATIENT
Start: 2020-04-01 | End: 2020-04-11

## 2020-04-01 RX ADMIN — PREDNISONE 60 MG: 20 TABLET ORAL at 01:23

## 2020-04-01 RX ADMIN — ORPHENADRINE CITRATE 30 MG: 30 INJECTION INTRAMUSCULAR; INTRAVENOUS at 01:23

## 2020-04-01 RX ADMIN — HYDROMORPHONE HYDROCHLORIDE 1 MG: 1 INJECTION, SOLUTION INTRAMUSCULAR; INTRAVENOUS; SUBCUTANEOUS at 01:24

## 2020-04-01 ASSESSMENT — PAIN SCALES - GENERAL
PAINLEVEL_OUTOF10: 10
PAINLEVEL_OUTOF10: 8

## 2020-04-01 ASSESSMENT — PAIN DESCRIPTION - FREQUENCY: FREQUENCY: CONTINUOUS

## 2020-04-01 ASSESSMENT — PAIN DESCRIPTION - PAIN TYPE: TYPE: ACUTE PAIN

## 2020-04-01 ASSESSMENT — PAIN DESCRIPTION - ORIENTATION: ORIENTATION: LOWER

## 2020-04-01 ASSESSMENT — PAIN DESCRIPTION - LOCATION: LOCATION: BACK

## 2020-04-01 ASSESSMENT — PAIN DESCRIPTION - DESCRIPTORS: DESCRIPTORS: SHARP;SHOOTING

## 2020-04-01 NOTE — ED PROVIDER NOTES
reviewed. Allergies: Patient has no known allergies. -------------------------------------------------- RESULTS -------------------------------------------------  All laboratory and radiology results have been personally reviewed by myself   LABS:  No results found for this visit on 04/01/20. RADIOLOGY:  Interpreted by Radiologist.  CT Lumbar Spine WO Contrast   Final Result   1. Multilevel moderate neural foraminal stenoses mid and lower lumbar spine    due to disc bulges and bony hypertrophy. 2.  Moderate spinal canal stenosis at L3-L4 due to disc bulge, subluxation,    ligamentum flavum and facet hypertrophy. ASSESSMENT:     POSITIVE report - The findings in this report may impact patient care. This report has been electronically signed by Aldo Mcallister MD.          ------------------------- NURSING NOTES AND VITALS REVIEWED ---------------------------   The nursing notes within the ED encounter and vital signs as below have been reviewed. BP (!) 185/115   Pulse 69   Temp 98 °F (36.7 °C) (Temporal)   Resp 17   Ht 5' 10\" (1.778 m)   Wt 255 lb (115.7 kg)   SpO2 99%   BMI 36.59 kg/m²   Oxygen Saturation Interpretation: Normal      ---------------------------------------------------PHYSICAL EXAM--------------------------------------      Constitutional/General: Alert and oriented x3, well appearing, non toxic in NAD  Head: Normocephalic and atraumatic  Eyes: PERRL, EOMI  Mouth: Oropharynx clear, handling secretions, no trismus  Neck: Supple, full ROM,   Pulmonary: Lungs clear to auscultation bilaterally, no wheezes, rales, or rhonchi. Not in respiratory distress  Cardiovascular:  Regular rate and rhythm, no murmurs, gallops, or rubs. 2+ distal pulses  Abdomen: Soft, non tender, non distended,   Extremities: Moves all extremities x 4. Warm and well perfused, cervical, thoracic, lumbar sacral spine are all midline. No step-off noted.   Patient with point tenderness to left sacroiliac

## 2020-12-02 ENCOUNTER — HOSPITAL ENCOUNTER (OUTPATIENT)
Age: 55
Discharge: HOME | End: 2020-12-04

## 2020-12-02 PROCEDURE — 88304 TISSUE EXAM BY PATHOLOGIST: CPT

## 2020-12-29 ENCOUNTER — HOSPITAL ENCOUNTER (OUTPATIENT)
Age: 55
Discharge: HOME | End: 2020-12-31

## 2020-12-29 PROCEDURE — 87081 CULTURE SCREEN ONLY: CPT

## 2020-12-31 LAB — MRSA CULTURE ONLY: NORMAL

## 2021-01-12 ENCOUNTER — HOSPITAL ENCOUNTER (OUTPATIENT)
Age: 56
Discharge: HOME | End: 2021-01-14

## 2021-01-12 LAB
ABO/RH: NORMAL
ANTIBODY SCREEN: NORMAL

## 2021-01-12 PROCEDURE — 86901 BLOOD TYPING SEROLOGIC RH(D): CPT

## 2021-01-12 PROCEDURE — 86850 RBC ANTIBODY SCREEN: CPT

## 2021-01-12 PROCEDURE — 86900 BLOOD TYPING SEROLOGIC ABO: CPT

## 2021-01-13 ENCOUNTER — HOSPITAL ENCOUNTER (OUTPATIENT)
Age: 56
Discharge: HOME | End: 2021-01-15

## 2021-01-13 LAB
ANION GAP SERPL CALCULATED.3IONS-SCNC: 11 MMOL/L (ref 7–16)
BUN BLDV-MCNC: 19 MG/DL (ref 6–20)
CALCIUM SERPL-MCNC: 8.7 MG/DL (ref 8.6–10.2)
CHLORIDE BLD-SCNC: 102 MMOL/L (ref 98–107)
CO2: 24 MMOL/L (ref 22–29)
CREAT SERPL-MCNC: 1 MG/DL (ref 0.7–1.2)
GFR AFRICAN AMERICAN: >60
GFR NON-AFRICAN AMERICAN: >60 ML/MIN/1.73
GLUCOSE BLD-MCNC: 106 MG/DL (ref 74–99)
HCT VFR BLD CALC: 43.2 % (ref 37–54)
HEMOGLOBIN: 13.9 G/DL (ref 12.5–16.5)
MCH RBC QN AUTO: 30.3 PG (ref 26–35)
MCHC RBC AUTO-ENTMCNC: 32.2 % (ref 32–34.5)
MCV RBC AUTO: 94.3 FL (ref 80–99.9)
PDW BLD-RTO: 13 FL (ref 11.5–15)
PLATELET # BLD: 177 E9/L (ref 130–450)
PMV BLD AUTO: 11.4 FL (ref 7–12)
POTASSIUM SERPL-SCNC: 4 MMOL/L (ref 3.5–5)
RBC # BLD: 4.58 E12/L (ref 3.8–5.8)
SODIUM BLD-SCNC: 137 MMOL/L (ref 132–146)
WBC # BLD: 13.9 E9/L (ref 4.5–11.5)

## 2021-01-13 PROCEDURE — 85027 COMPLETE CBC AUTOMATED: CPT

## 2021-01-13 PROCEDURE — 80048 BASIC METABOLIC PNL TOTAL CA: CPT

## 2021-01-14 ENCOUNTER — HOSPITAL ENCOUNTER (OUTPATIENT)
Age: 56
Discharge: HOME | End: 2021-01-16

## 2021-01-14 LAB
ANION GAP SERPL CALCULATED.3IONS-SCNC: 9 MMOL/L (ref 7–16)
BUN BLDV-MCNC: 17 MG/DL (ref 6–20)
CALCIUM SERPL-MCNC: 8.5 MG/DL (ref 8.6–10.2)
CHLORIDE BLD-SCNC: 102 MMOL/L (ref 98–107)
CO2: 26 MMOL/L (ref 22–29)
CREAT SERPL-MCNC: 0.9 MG/DL (ref 0.7–1.2)
GFR AFRICAN AMERICAN: >60
GFR NON-AFRICAN AMERICAN: >60 ML/MIN/1.73
GLUCOSE BLD-MCNC: 95 MG/DL (ref 74–99)
HCT VFR BLD CALC: 39.9 % (ref 37–54)
HEMOGLOBIN: 13 G/DL (ref 12.5–16.5)
MCH RBC QN AUTO: 30.9 PG (ref 26–35)
MCHC RBC AUTO-ENTMCNC: 32.6 % (ref 32–34.5)
MCV RBC AUTO: 94.8 FL (ref 80–99.9)
PDW BLD-RTO: 13.3 FL (ref 11.5–15)
PLATELET # BLD: 144 E9/L (ref 130–450)
PMV BLD AUTO: 11.5 FL (ref 7–12)
POTASSIUM SERPL-SCNC: 3.8 MMOL/L (ref 3.5–5)
RBC # BLD: 4.21 E12/L (ref 3.8–5.8)
SODIUM BLD-SCNC: 137 MMOL/L (ref 132–146)
WBC # BLD: 12.2 E9/L (ref 4.5–11.5)

## 2021-01-14 PROCEDURE — 85027 COMPLETE CBC AUTOMATED: CPT

## 2021-01-14 PROCEDURE — 80048 BASIC METABOLIC PNL TOTAL CA: CPT

## 2021-01-15 ENCOUNTER — HOSPITAL ENCOUNTER (OUTPATIENT)
Age: 56
Discharge: HOME | End: 2021-01-17

## 2021-01-15 LAB
ANION GAP SERPL CALCULATED.3IONS-SCNC: 10 MMOL/L (ref 7–16)
BACTERIA: ABNORMAL /HPF
BILIRUBIN URINE: NEGATIVE
BLOOD, URINE: ABNORMAL
BUN BLDV-MCNC: 19 MG/DL (ref 6–20)
CALCIUM SERPL-MCNC: 8.8 MG/DL (ref 8.6–10.2)
CHLORIDE BLD-SCNC: 100 MMOL/L (ref 98–107)
CLARITY: CLEAR
CO2: 26 MMOL/L (ref 22–29)
COLOR: YELLOW
CREAT SERPL-MCNC: 0.9 MG/DL (ref 0.7–1.2)
GFR AFRICAN AMERICAN: >60
GFR NON-AFRICAN AMERICAN: >60 ML/MIN/1.73
GLUCOSE BLD-MCNC: 91 MG/DL (ref 74–99)
GLUCOSE URINE: NEGATIVE MG/DL
HCT VFR BLD CALC: 40.4 % (ref 37–54)
HEMOGLOBIN: 12.7 G/DL (ref 12.5–16.5)
KETONES, URINE: NEGATIVE MG/DL
LEUKOCYTE ESTERASE, URINE: NEGATIVE
MCH RBC QN AUTO: 30.2 PG (ref 26–35)
MCHC RBC AUTO-ENTMCNC: 31.4 % (ref 32–34.5)
MCV RBC AUTO: 96.2 FL (ref 80–99.9)
NITRITE, URINE: NEGATIVE
PDW BLD-RTO: 13.2 FL (ref 11.5–15)
PH UA: 7 (ref 5–9)
PLATELET # BLD: 158 E9/L (ref 130–450)
PMV BLD AUTO: 11.1 FL (ref 7–12)
POTASSIUM SERPL-SCNC: 3.9 MMOL/L (ref 3.5–5)
PROTEIN UA: NEGATIVE MG/DL
RBC # BLD: 4.2 E12/L (ref 3.8–5.8)
RBC UA: >20 /HPF (ref 0–2)
SODIUM BLD-SCNC: 136 MMOL/L (ref 132–146)
SPECIFIC GRAVITY UA: 1.01 (ref 1–1.03)
UROBILINOGEN, URINE: 1 E.U./DL
WBC # BLD: 10.2 E9/L (ref 4.5–11.5)
WBC UA: ABNORMAL /HPF (ref 0–5)

## 2021-01-15 PROCEDURE — 87088 URINE BACTERIA CULTURE: CPT

## 2021-01-15 PROCEDURE — 80048 BASIC METABOLIC PNL TOTAL CA: CPT

## 2021-01-15 PROCEDURE — 85027 COMPLETE CBC AUTOMATED: CPT

## 2021-01-15 PROCEDURE — 81001 URINALYSIS AUTO W/SCOPE: CPT

## 2021-01-17 LAB — URINE CULTURE, ROUTINE: NORMAL

## 2023-10-16 ENCOUNTER — TELEPHONE (OUTPATIENT)
Dept: ORTHOPEDIC SURGERY | Age: 58
End: 2023-10-16

## 2023-10-26 ENCOUNTER — OFFICE VISIT (OUTPATIENT)
Dept: ORTHOPEDIC SURGERY | Age: 58
End: 2023-10-26

## 2023-10-26 VITALS — HEIGHT: 69 IN | WEIGHT: 280 LBS | BODY MASS INDEX: 41.47 KG/M2

## 2023-10-26 DIAGNOSIS — M25.552 BILATERAL HIP PAIN: Primary | ICD-10-CM

## 2023-10-26 DIAGNOSIS — M25.551 BILATERAL HIP PAIN: Primary | ICD-10-CM

## 2023-10-26 RX ORDER — BETAMETHASONE SODIUM PHOSPHATE AND BETAMETHASONE ACETATE 3; 3 MG/ML; MG/ML
6 INJECTION, SUSPENSION INTRA-ARTICULAR; INTRALESIONAL; INTRAMUSCULAR; SOFT TISSUE ONCE
Status: COMPLETED | OUTPATIENT
Start: 2023-10-26 | End: 2023-10-26

## 2023-10-26 RX ORDER — LIDOCAINE HYDROCHLORIDE 10 MG/ML
5 INJECTION, SOLUTION INFILTRATION; PERINEURAL ONCE
Status: COMPLETED | OUTPATIENT
Start: 2023-10-26 | End: 2023-10-26

## 2023-10-26 RX ADMIN — LIDOCAINE HYDROCHLORIDE 5 ML: 10 INJECTION, SOLUTION INFILTRATION; PERINEURAL at 13:22

## 2023-10-26 RX ADMIN — BETAMETHASONE SODIUM PHOSPHATE AND BETAMETHASONE ACETATE 6 MG: 3; 3 INJECTION, SUSPENSION INTRA-ARTICULAR; INTRALESIONAL; INTRAMUSCULAR; SOFT TISSUE at 13:21

## 2023-10-26 NOTE — PROGRESS NOTES
PROCEDURE NOTE:    DIAGNOSIS      RIGHT hip pain. LEFT hip pain. PROCEDURE     Ultrasound-guided RIGHT greater trochanteric bursa corticosteroid injection. Ultrasound-guided LEFT greater trochanteric bursa corticosteroid injection. PROCEDURAL PAUSE     Procedural pause conducted to verify: ?correct patient identity, procedure to be performed, and as applicable, correct side and site, correct patient position, and availability of implants, special equipment, or special requirements. PROCEDURE DETAILS     The procedure was carried out under sterile technique. Patient Position: Lateral decubitus position with the painful hip facing upwards. Localization Process: ? The RIGHT greater trochanteric bursa was evaluated under ultrasound prior to starting the procedure. The skin was prepped with Betadine and Alcohol. ?     Approach: ? In-plane. Local Anesthesia: Under live ultrasound guidance, local anesthesia was obtained with vapocoolant cold spray and 3 cc of 1% lidocaine using a 25-gauge 2-inch needle advanced from an in-plane approach to the RIGHT greater trochanteric bursa. Injection/Aspiration: ?A 22-gauge 3-1/2-inch needle was advanced from an in-plane approach into the RIGHT greater trochanteric bursa. After visualization of the needle tip in the target area and negative aspiration for blood, a mixture of 3 cc of 1% lidocaine and 1 cc of betamethasone (6 mg/cc) was injected into the RIGHT greater trochanteric bursa with excellent sonographic flow. Images of procedure were permanently recorded. Localization Process: ? The LEFT greater trochanteric bursa was evaluated under ultrasound prior to starting the procedure. The skin was prepped with Betadine and Alcohol. ?     Approach: ? In-plane.      Local Anesthesia: Under live ultrasound guidance, local anesthesia was obtained with vapocoolant cold spray and 3 cc of 1% lidocaine using a 25-gauge 2-inch needle advanced from an in-plane approach

## 2024-05-29 ENCOUNTER — HOSPITAL ENCOUNTER (EMERGENCY)
Age: 59
Discharge: HOME | End: 2024-05-29
Payer: COMMERCIAL

## 2024-05-29 ENCOUNTER — APPOINTMENT (OUTPATIENT)
Dept: CT IMAGING | Age: 59
End: 2024-05-29
Payer: COMMERCIAL

## 2024-05-29 VITALS
BODY MASS INDEX: 42.21 KG/M2 | WEIGHT: 285 LBS | OXYGEN SATURATION: 96 % | HEIGHT: 69 IN | SYSTOLIC BLOOD PRESSURE: 148 MMHG | DIASTOLIC BLOOD PRESSURE: 92 MMHG | TEMPERATURE: 98.4 F | HEART RATE: 69 BPM | RESPIRATION RATE: 19 BRPM

## 2024-05-29 DIAGNOSIS — S09.90XA CLOSED HEAD INJURY, INITIAL ENCOUNTER: Primary | ICD-10-CM

## 2024-05-29 DIAGNOSIS — S01.01XA LACERATION OF SCALP, INITIAL ENCOUNTER: ICD-10-CM

## 2024-05-29 PROCEDURE — 90471 IMMUNIZATION ADMIN: CPT | Performed by: PHYSICIAN ASSISTANT

## 2024-05-29 PROCEDURE — 70450 CT HEAD/BRAIN W/O DYE: CPT

## 2024-05-29 PROCEDURE — 6370000000 HC RX 637 (ALT 250 FOR IP): Performed by: PHYSICIAN ASSISTANT

## 2024-05-29 PROCEDURE — 6360000002 HC RX W HCPCS: Performed by: PHYSICIAN ASSISTANT

## 2024-05-29 PROCEDURE — 12013 RPR F/E/E/N/L/M 2.6-5.0 CM: CPT

## 2024-05-29 PROCEDURE — 99284 EMERGENCY DEPT VISIT MOD MDM: CPT

## 2024-05-29 PROCEDURE — 90714 TD VACC NO PRESV 7 YRS+ IM: CPT | Performed by: PHYSICIAN ASSISTANT

## 2024-05-29 RX ORDER — ACETAMINOPHEN 500 MG
1000 TABLET ORAL ONCE
Status: COMPLETED | OUTPATIENT
Start: 2024-05-29 | End: 2024-05-29

## 2024-05-29 RX ADMIN — CLOSTRIDIUM TETANI TOXOID ANTIGEN (FORMALDEHYDE INACTIVATED) AND CORYNEBACTERIUM DIPHTHERIAE TOXOID ANTIGEN (FORMALDEHYDE INACTIVATED) 0.5 ML: 5; 2 INJECTION, SUSPENSION INTRAMUSCULAR at 15:09

## 2024-05-29 RX ADMIN — ACETAMINOPHEN 1000 MG: 500 TABLET ORAL at 15:09

## 2024-05-29 ASSESSMENT — LIFESTYLE VARIABLES
HOW MANY STANDARD DRINKS CONTAINING ALCOHOL DO YOU HAVE ON A TYPICAL DAY: 1 OR 2
HOW OFTEN DO YOU HAVE A DRINK CONTAINING ALCOHOL: 2-3 TIMES A WEEK

## 2024-05-29 NOTE — ED PROVIDER NOTES
plan.      --------------------------------- IMPRESSION AND DISPOSITION ---------------------------------    IMPRESSION  1. Closed head injury, initial encounter    2. Laceration of scalp, initial encounter        DISPOSITION  Disposition: Discharge to home  Patient condition is good                 Patito Wyatt PA-C  05/29/24 1930

## 2024-11-01 ENCOUNTER — HOSPITAL ENCOUNTER (OUTPATIENT)
Age: 59
Discharge: HOME | End: 2024-11-03

## 2024-11-01 PROCEDURE — 88305 TISSUE EXAM BY PATHOLOGIST: CPT

## 2024-11-07 LAB — SURGICAL PATHOLOGY REPORT: NORMAL

## 2025-05-07 ENCOUNTER — TRANSCRIBE ORDERS (OUTPATIENT)
Dept: ADMINISTRATIVE | Age: 60
End: 2025-05-07

## 2025-05-07 DIAGNOSIS — I51.7 CARDIOMEGALY: Primary | ICD-10-CM

## 2025-08-19 ENCOUNTER — HOSPITAL ENCOUNTER (OUTPATIENT)
Dept: MRI IMAGING | Age: 60
Discharge: HOME | End: 2025-08-21
Attending: INTERNAL MEDICINE

## 2025-08-19 DIAGNOSIS — I51.7 CARDIOMEGALY: ICD-10-CM

## 2025-08-20 ENCOUNTER — HOSPITAL ENCOUNTER (OUTPATIENT)
Dept: MRI IMAGING | Age: 60
Discharge: HOME | End: 2025-08-22
Payer: COMMERCIAL

## 2025-08-20 PROCEDURE — 75561 CARDIAC MRI FOR MORPH W/DYE: CPT

## 2025-08-20 PROCEDURE — 6360000004 HC RX CONTRAST MEDICATION: Performed by: RADIOLOGY

## 2025-08-20 PROCEDURE — A9579 GAD-BASE MR CONTRAST NOS,1ML: HCPCS | Performed by: RADIOLOGY

## 2025-08-20 RX ORDER — GADOTERIDOL 279.3 MG/ML
40 INJECTION INTRAVENOUS
Status: COMPLETED | OUTPATIENT
Start: 2025-08-20 | End: 2025-08-20

## 2025-08-20 RX ADMIN — GADOTERIDOL 40 ML: 279.3 INJECTION, SOLUTION INTRAVENOUS at 13:03

## (undated) DEVICE — STOCKINETTE COMPR W4XL54IN 2 PLY COT

## (undated) DEVICE — NEEDLE HYPO 25GA L1.5IN BLU POLYPR HUB S STL REG BVL STR

## (undated) DEVICE — BASIC PACK: Brand: CONVERTORS

## (undated) DEVICE — TIBURON EXTREMITY SHEET: Brand: CONVERTORS

## (undated) DEVICE — BANDAGE COMPR W4XL108IN WHT LAYERED NO CLSR SYN RUB ESMARCH

## (undated) DEVICE — BANDAGE COMPR W2INXL5YD WHT BGE POLY COT M E WRP WV HK AND

## (undated) DEVICE — CUFF TOURNIQUET 18 SNG BLADDER DUAL PORT

## (undated) DEVICE — Z DISCONTINUED NO SUB IDED DRAIN PENROSE L12IN 0.25IN USED TO PROMOTE DRNAGE IN OPN

## (undated) DEVICE — CONTROL SYRINGE LUER-LOCK TIP: Brand: MONOJECT

## (undated) DEVICE — DRAPE C ARM W41XL74IN UNIV MOB W RUBBERBAND CLP

## (undated) DEVICE — 12 ML SYRINGE,LUER-LOCK TIP: Brand: MONOJECT

## (undated) DEVICE — BLADE OPHTH GRN ROUNDED TIP 1 SIDE SHRP GRINDLESS MINI-BLDE

## (undated) DEVICE — SOLUTION SCRB 32OZ 7.5% POVIDONE IOD BTL GENTLE EFFECTIVE

## (undated) DEVICE — SUTURE ETHLN SZ 4-0 L18IN NONABSORBABLE BLK L19MM PS-2 3/8 1667H

## (undated) DEVICE — 20 ML SYRINGE REGULAR TIP: Brand: MONOJECT

## (undated) DEVICE — MARKER,SKIN,WI/RULER AND LABELS: Brand: MEDLINE

## (undated) DEVICE — PAD N ADH W3XL4IN POLY COT SFT PERF FLM EASILY CUT ABSRB

## (undated) DEVICE — 1810 FOAM BLOCK NEEDLE COUNTER: Brand: DEVON

## (undated) DEVICE — PEN: MARKING STD 100/CS: Brand: MEDICAL ACTION INDUSTRIES

## (undated) DEVICE — BANDAGE,GAUZE,BULKEE II,4.5"X4.1YD,STRL: Brand: MEDLINE

## (undated) DEVICE — GOWN SURG XL SMS FAB NONREINFORCED RAGLAN SLV HK LOOP CLSR

## (undated) DEVICE — ELECTRODE PT RET AD L9FT HI MOIST COND ADH HYDRGEL CORDED

## (undated) DEVICE — CATHETER IV 20GA L1.16IN OD1.080MM ID0.800MM 60ML/MIN PNK

## (undated) DEVICE — TOWEL OR BLUEE 16X26IN ST 8 PACK ORB08 16X26ORTWL

## (undated) DEVICE — GAUZE,SPONGE,4"X4",16PLY,XRAY,STRL,LF: Brand: MEDLINE

## (undated) DEVICE — BANDAGE COMPR W4INXL5YD WHT BGE POLY COT M E WRP WV HK AND

## (undated) DEVICE — GLOVE ORANGE PI 8 1/2   MSG9085

## (undated) DEVICE — SOLUTION IV IRRIG POUR BRL 0.9% SODIUM CHL 2F7124

## (undated) DEVICE — HANDLE CVR PATENTED RETENTION DISC STRL LIGHT SHLD

## (undated) DEVICE — GLOVE ORANGE PI 8   MSG9080

## (undated) DEVICE — NEEDLE HYPO 18GA L1.5IN PNK POLYPR HUB S STL THN WALL FILL

## (undated) DEVICE — INTENDED FOR TISSUE SEPARATION, AND OTHER PROCEDURES THAT REQUIRE A SHARP SURGICAL BLADE TO PUNCTURE OR CUT.: Brand: BARD-PARKER ® STAINLESS STEEL BLADES

## (undated) DEVICE — ROUND DIAMOND, EXTENDED

## (undated) DEVICE — 2.0MM DIAMOND ROUND BUR

## (undated) DEVICE — PENCIL ES L3M BTTN SWCH HOLSTER W/ BLDE ELECTRD EDGE